# Patient Record
Sex: MALE | Race: WHITE | NOT HISPANIC OR LATINO | URBAN - METROPOLITAN AREA
[De-identification: names, ages, dates, MRNs, and addresses within clinical notes are randomized per-mention and may not be internally consistent; named-entity substitution may affect disease eponyms.]

---

## 2017-04-14 ENCOUNTER — OUTPATIENT (OUTPATIENT)
Dept: OUTPATIENT SERVICES | Facility: HOSPITAL | Age: 57
LOS: 1 days | Discharge: HOME | End: 2017-04-14
Payer: COMMERCIAL

## 2017-04-14 DIAGNOSIS — I88.9 NONSPECIFIC LYMPHADENITIS, UNSPECIFIED: ICD-10-CM

## 2017-04-14 PROCEDURE — 70491 CT SOFT TISSUE NECK W/DYE: CPT | Mod: 26

## 2019-03-20 ENCOUNTER — APPOINTMENT (OUTPATIENT)
Dept: DERMATOLOGY | Facility: CLINIC | Age: 59
End: 2019-03-20
Payer: COMMERCIAL

## 2019-03-20 VITALS — WEIGHT: 215 LBS | HEIGHT: 68 IN | BODY MASS INDEX: 32.58 KG/M2

## 2019-03-20 DIAGNOSIS — Z86.39 PERSONAL HISTORY OF OTHER ENDOCRINE, NUTRITIONAL AND METABOLIC DISEASE: ICD-10-CM

## 2019-03-20 DIAGNOSIS — L73.8 OTHER SPECIFIED FOLLICULAR DISORDERS: ICD-10-CM

## 2019-03-20 DIAGNOSIS — D48.9 NEOPLASM OF UNCERTAIN BEHAVIOR, UNSPECIFIED: ICD-10-CM

## 2019-03-20 PROCEDURE — 11102 TANGNTL BX SKIN SINGLE LES: CPT

## 2019-03-20 PROCEDURE — 99203 OFFICE O/P NEW LOW 30 MIN: CPT | Mod: 25

## 2019-03-20 RX ORDER — METRONIDAZOLE 500 MG/1
500 TABLET ORAL
Qty: 40 | Refills: 0 | Status: DISCONTINUED | COMMUNITY
Start: 2018-12-17

## 2019-03-20 RX ORDER — CIPROFLOXACIN HYDROCHLORIDE 500 MG/1
500 TABLET, FILM COATED ORAL
Qty: 20 | Refills: 0 | Status: DISCONTINUED | COMMUNITY
Start: 2018-12-17

## 2019-03-20 RX ORDER — ASPIRIN 81 MG
81 TABLET,CHEWABLE ORAL
Refills: 0 | Status: ACTIVE | COMMUNITY

## 2019-03-20 RX ORDER — ATORVASTATIN CALCIUM 10 MG/1
10 TABLET, FILM COATED ORAL
Qty: 30 | Refills: 0 | Status: ACTIVE | COMMUNITY
Start: 2018-12-24

## 2019-03-20 RX ORDER — AZITHROMYCIN 250 MG/1
250 TABLET, FILM COATED ORAL
Qty: 6 | Refills: 0 | Status: DISCONTINUED | COMMUNITY
Start: 2018-10-09

## 2019-03-20 RX ORDER — LEVOFLOXACIN 750 MG/1
750 TABLET, FILM COATED ORAL
Qty: 5 | Refills: 0 | Status: DISCONTINUED | COMMUNITY
Start: 2019-01-15

## 2019-03-20 RX ORDER — ALBUTEROL SULFATE 90 UG/1
108 (90 BASE) AEROSOL, METERED RESPIRATORY (INHALATION)
Qty: 18 | Refills: 0 | Status: DISCONTINUED | COMMUNITY
Start: 2018-12-24

## 2019-03-20 NOTE — ASSESSMENT
[FreeTextEntry1] : 1) benign findings as above- education\par -skin tags on separate visit at [facility]\par \par 2) Shave bx location left frontal scalp\par diagnosis: ruleo ut epidermal nevus vs. SK\par  \par Shave biopsy performed today over above location, risks and benefits discussed including incomplete removal, not enough tissue for diagnosis scarring and infection, informed consent obtained, pictures taken,  cleaned with alcohol and anesthetized with 1%lido+epi, 0.3 cc total, hemostasis obtained with light dessication, vaseline and bandaid placed, tolerated well, wound care reviewed, specimen sent to pathology.\par \par

## 2019-03-20 NOTE — PHYSICAL EXAM
[FreeTextEntry3] : AAOx3, pleasant, NAD, no visual lymphadenopathy\par hair, scalp, face, nose, eyelids, ears, lips, oropharynx, neck, chest, abdomen, back, right arm, left arm, nails, and hands examined with all normal findings,\par pertinent findings include:\par \par Pink/tan umbilicated firm papules with central dell on face;\par skin tags on axillae and groin\par verrucous plaque on left frontal scalp\par Scaling waxy stuck on papule on left torso

## 2019-03-20 NOTE — HISTORY OF PRESENT ILLNESS
[FreeTextEntry1] : growth [de-identified] : patient here with growths. would like skin tags evaluated. also with growth on left torso. \par also bleeding growth on left scalp.

## 2019-03-26 ENCOUNTER — MOBILE ON CALL (OUTPATIENT)
Age: 59
End: 2019-03-26

## 2019-03-29 LAB — CORE LAB BIOPSY: NORMAL

## 2019-09-26 ENCOUNTER — APPOINTMENT (OUTPATIENT)
Dept: GASTROENTEROLOGY | Facility: CLINIC | Age: 59
End: 2019-09-26
Payer: COMMERCIAL

## 2019-09-26 VITALS — HEART RATE: 87 BPM | SYSTOLIC BLOOD PRESSURE: 136 MMHG | HEIGHT: 68 IN | DIASTOLIC BLOOD PRESSURE: 79 MMHG

## 2019-09-26 DIAGNOSIS — K62.5 HEMORRHAGE OF ANUS AND RECTUM: ICD-10-CM

## 2019-09-26 PROCEDURE — 99205 OFFICE O/P NEW HI 60 MIN: CPT | Mod: 25

## 2019-09-26 PROCEDURE — 82274 ASSAY TEST FOR BLOOD FECAL: CPT | Mod: QW

## 2019-09-26 RX ORDER — SODIUM SULFATE, POTASSIUM SULFATE, MAGNESIUM SULFATE 17.5; 3.13; 1.6 G/ML; G/ML; G/ML
17.5-3.13-1.6 SOLUTION, CONCENTRATE ORAL
Qty: 1 | Refills: 0 | Status: ACTIVE | COMMUNITY
Start: 2019-09-26 | End: 1900-01-01

## 2019-09-26 NOTE — ASSESSMENT
[FreeTextEntry1] : #1 history of colon polyps with recent rectal bleeding we'll schedule colonoscopy despite the fact that the Hemoccult was negative #2 enlarged liver in someone who has elevated cholesterol does not consume much alcohol and no history of diabetes but has recently gained weight will evaluate for fatty liver will rule out any genetic causes of cirrhosis

## 2019-09-26 NOTE — HISTORY OF PRESENT ILLNESS
[FreeTextEntry1] : Should his today as is noticed frequent episodes of bright red blood per rectum he began driving on a motorcycle several months ago he denies any rectal pain or constipation denies any diarrhea the blood would squirt out. He has had weight gain over the past few years he denies alcohol use or smoking his father recently  and was told he had cirrhosis

## 2019-10-30 ENCOUNTER — APPOINTMENT (OUTPATIENT)
Dept: GASTROENTEROLOGY | Facility: AMBULATORY MEDICAL SERVICES | Age: 59
End: 2019-10-30

## 2020-03-02 ENCOUNTER — RESULT REVIEW (OUTPATIENT)
Age: 60
End: 2020-03-02

## 2020-03-02 ENCOUNTER — APPOINTMENT (OUTPATIENT)
Dept: GASTROENTEROLOGY | Facility: AMBULATORY MEDICAL SERVICES | Age: 60
End: 2020-03-02
Payer: COMMERCIAL

## 2020-03-02 PROCEDURE — 45380 COLONOSCOPY AND BIOPSY: CPT

## 2020-08-03 ENCOUNTER — APPOINTMENT (OUTPATIENT)
Dept: UROLOGY | Facility: CLINIC | Age: 60
End: 2020-08-03
Payer: COMMERCIAL

## 2020-08-13 ENCOUNTER — APPOINTMENT (OUTPATIENT)
Dept: UROLOGY | Facility: CLINIC | Age: 60
End: 2020-08-13
Payer: COMMERCIAL

## 2020-08-13 VITALS
SYSTOLIC BLOOD PRESSURE: 115 MMHG | BODY MASS INDEX: 32.58 KG/M2 | OXYGEN SATURATION: 96 % | TEMPERATURE: 98 F | HEART RATE: 81 BPM | DIASTOLIC BLOOD PRESSURE: 73 MMHG | HEIGHT: 68 IN | WEIGHT: 215 LBS

## 2020-08-13 DIAGNOSIS — Z87.891 PERSONAL HISTORY OF NICOTINE DEPENDENCE: ICD-10-CM

## 2020-08-13 DIAGNOSIS — Z80.52 FAMILY HISTORY OF MALIGNANT NEOPLASM OF BLADDER: ICD-10-CM

## 2020-08-13 DIAGNOSIS — Z78.9 OTHER SPECIFIED HEALTH STATUS: ICD-10-CM

## 2020-08-13 PROCEDURE — 99204 OFFICE O/P NEW MOD 45 MIN: CPT

## 2020-08-13 NOTE — HISTORY OF PRESENT ILLNESS
[FreeTextEntry1] : 60 year old man  with complaint of microscopic hematuria. This was seen on routine screening UA, he believes a urine dip. He reports he had hematuria work up by another urologist with CT and cysto, about a year ago. He says these were negative. We do not have any medical records to review. \par It is mild in severity as the patient denies any gross hematuria. Nothing makes symptoms occur. \par It is associated with nothing. No other LUTS. \par No gross hematuria, no dysuria, no frequency, no urgency, no hesitancy, no straining. No incontinence. \par No fevers, no chills, no nausea, no vomiting, no flank pain. \par \par Of note, father has bladder cancer, twin brother is being worked up for possible kidney stones.

## 2020-08-13 NOTE — PHYSICAL EXAM
[General Appearance - Well Developed] : well developed [General Appearance - Well Nourished] : well nourished [Normal Appearance] : normal appearance [Well Groomed] : well groomed [General Appearance - In No Acute Distress] : no acute distress [Edema] : no peripheral edema [Respiration, Rhythm And Depth] : normal respiratory rhythm and effort [Exaggerated Use Of Accessory Muscles For Inspiration] : no accessory muscle use [Abdomen Soft] : soft [Abdomen Tenderness] : non-tender [Costovertebral Angle Tenderness] : no ~M costovertebral angle tenderness [Urethral Meatus] : meatus normal [Penis Abnormality] : normal circumcised penis [Urinary Bladder Findings] : the bladder was normal on palpation [Scrotum] : the scrotum was normal [Testes Tenderness] : no tenderness of the testes [Testes Mass (___cm)] : there were no testicular masses [Prostate Size ___ gm] : prostate size [unfilled] gm [No Prostate Nodules] : no prostate nodules [Normal Station and Gait] : the gait and station were normal for the patient's age [] : no rash [No Focal Deficits] : no focal deficits [Affect] : the affect was normal [Oriented To Time, Place, And Person] : oriented to person, place, and time [Mood] : the mood was normal [No Palpable Adenopathy] : no palpable adenopathy [Not Anxious] : not anxious

## 2020-08-13 NOTE — REVIEW OF SYSTEMS
[Cough] : cough [Shortness Of Breath] : shortness of breath [Wheezing] : wheezing [Abdominal Pain] : abdominal pain [Vomiting] : vomiting [Diarrhea] : diarrhea [Heartburn] : heartburn [see HPI] : see HPI [Wake up at night to urinate  How many times?  ___] : wakes up to urinate [unfilled] times during the night [Told you have blood in urine on a urine test] : told blood was present in a urine test [Negative] : Heme/Lymph

## 2020-08-14 LAB
APPEARANCE: CLEAR
BACTERIA: NEGATIVE
BILIRUBIN URINE: NEGATIVE
BLOOD URINE: ABNORMAL
COLOR: YELLOW
GLUCOSE QUALITATIVE U: NEGATIVE
HYALINE CASTS: 0 /LPF
KETONES URINE: NEGATIVE
LEUKOCYTE ESTERASE URINE: NEGATIVE
MICROSCOPIC-UA: NORMAL
NITRITE URINE: NEGATIVE
PH URINE: 5.5
PROTEIN URINE: NEGATIVE
RED BLOOD CELLS URINE: 2 /HPF
SPECIFIC GRAVITY URINE: 1.03
SQUAMOUS EPITHELIAL CELLS: 0 /HPF
UROBILINOGEN URINE: NORMAL
WHITE BLOOD CELLS URINE: 0 /HPF

## 2020-08-21 LAB — BACTERIA UR CULT: NORMAL

## 2020-09-10 ENCOUNTER — APPOINTMENT (OUTPATIENT)
Dept: GASTROENTEROLOGY | Facility: CLINIC | Age: 60
End: 2020-09-10

## 2020-11-12 ENCOUNTER — APPOINTMENT (OUTPATIENT)
Dept: UROLOGY | Facility: CLINIC | Age: 60
End: 2020-11-12
Payer: COMMERCIAL

## 2020-11-12 VITALS
DIASTOLIC BLOOD PRESSURE: 76 MMHG | OXYGEN SATURATION: 95 % | BODY MASS INDEX: 33.74 KG/M2 | SYSTOLIC BLOOD PRESSURE: 115 MMHG | HEART RATE: 71 BPM | WEIGHT: 215 LBS | TEMPERATURE: 97.7 F | HEIGHT: 67 IN

## 2020-11-12 DIAGNOSIS — R31.29 OTHER MICROSCOPIC HEMATURIA: ICD-10-CM

## 2020-11-12 PROCEDURE — 99213 OFFICE O/P EST LOW 20 MIN: CPT

## 2020-11-12 PROCEDURE — 99072 ADDL SUPL MATRL&STAF TM PHE: CPT

## 2020-11-12 NOTE — REVIEW OF SYSTEMS
[Shortness Of Breath] : shortness of breath [Cough] : cough [Wheezing] : wheezing [Abdominal Pain] : abdominal pain [Vomiting] : vomiting [Diarrhea] : diarrhea [Heartburn] : heartburn [see HPI] : see HPI [Told you have blood in urine on a urine test] : told blood was present in a urine test [Wake up at night to urinate  How many times?  ___] : wakes up to urinate [unfilled] times during the night [Negative] : Heme/Lymph

## 2020-11-12 NOTE — HISTORY OF PRESENT ILLNESS
[FreeTextEntry1] : 60 year old man  with complaint of microscopic hematuria. This was seen on routine screening UA, he believes a urine dip. He reports he had hematuria work up by another urologist with CT and cysto, about a year ago. He says these were negative. We do not have any medical records to review. \par It is mild in severity as the patient denies any gross hematuria. Nothing makes symptoms occur. \par It is associated with nothing. No other LUTS. \par No gross hematuria, no dysuria, no frequency, no urgency, no hesitancy, no straining. No incontinence. \par No fevers, no chills, no nausea, no vomiting, no flank pain. Of note, father has bladder cancer, twin brother is being worked up for possible kidney stones. \par \par 11/12/2020: Patient presents for follow up. He denies new  symptoms and other medical  issues remain unchanged from above. No hematuria, no dysuria, no frequency, no urgency, no hesitancy, no straining. No incontinence. No fevers, no chills, no nausea, no vomiting, no flank pain. Repeat UA by Dr Mantilla showed 7 RBCs/hpf

## 2020-11-12 NOTE — ASSESSMENT
[FreeTextEntry1] : 60 year old man with microscopic hematuria. We discussed that the differential diagnosis includes both benign and malignant conditions including renal stones, BPH, urinary tract infections, and cancer of the bladder or ureter or kidney. Cystoscopy was recommended to rule out pathology in the bladder. CT Urogram was recommended to evaluate for presence of nephroureteral stones or malignancies. Urinalysis and urine culture were recommended to check for urinary tract infection. Pt agrees and understands.

## 2020-11-13 LAB — URINE CYTOLOGY: NORMAL

## 2020-11-24 ENCOUNTER — NON-APPOINTMENT (OUTPATIENT)
Age: 60
End: 2020-11-24

## 2020-12-01 ENCOUNTER — TRANSCRIPTION ENCOUNTER (OUTPATIENT)
Age: 60
End: 2020-12-01

## 2020-12-08 ENCOUNTER — APPOINTMENT (OUTPATIENT)
Dept: UROLOGY | Facility: CLINIC | Age: 60
End: 2020-12-08

## 2020-12-22 ENCOUNTER — APPOINTMENT (OUTPATIENT)
Dept: GASTROENTEROLOGY | Facility: CLINIC | Age: 60
End: 2020-12-22
Payer: COMMERCIAL

## 2020-12-22 VITALS
SYSTOLIC BLOOD PRESSURE: 126 MMHG | HEIGHT: 67 IN | DIASTOLIC BLOOD PRESSURE: 91 MMHG | TEMPERATURE: 97 F | BODY MASS INDEX: 33.12 KG/M2 | HEART RATE: 72 BPM | WEIGHT: 211 LBS

## 2020-12-22 DIAGNOSIS — E66.3 OVERWEIGHT: ICD-10-CM

## 2020-12-22 DIAGNOSIS — K76.89 OTHER SPECIFIED DISEASES OF LIVER: ICD-10-CM

## 2020-12-22 PROCEDURE — 99214 OFFICE O/P EST MOD 30 MIN: CPT

## 2020-12-22 PROCEDURE — 99072 ADDL SUPL MATRL&STAF TM PHE: CPT

## 2020-12-22 NOTE — ASSESSMENT
[FreeTextEntry1] : overweight will begin dieting and 6 week follow up\par fatty liver will check bloodwork\par hepatic cyst repeat sono 1 year\par gall stones on ct asy[facility]tomatic will monitor on low fat diet

## 2020-12-22 NOTE — PHYSICAL EXAM
[General Appearance - Alert] : alert [General Appearance - In No Acute Distress] : in no acute distress [Sclera] : the sclera and conjunctiva were normal [PERRL With Normal Accommodation] : pupils were equal in size, round, and reactive to light [Extraocular Movements] : extraocular movements were intact [Outer Ear] : the ears and nose were normal in appearance [Oropharynx] : the oropharynx was normal [Neck Appearance] : the appearance of the neck was normal [Neck Cervical Mass (___cm)] : no neck mass was observed [Jugular Venous Distention Increased] : there was no jugular-venous distention [Thyroid Diffuse Enlargement] : the thyroid was not enlarged [Thyroid Nodule] : there were no palpable thyroid nodules [Auscultation Breath Sounds / Voice Sounds] : lungs were clear to auscultation bilaterally [Heart Rate And Rhythm] : heart rate was normal and rhythm regular [Heart Sounds] : normal S1 and S2 [Heart Sounds Gallop] : no gallops [Murmurs] : no murmurs [Heart Sounds Pericardial Friction Rub] : no pericardial rub [Bowel Sounds] : normal bowel sounds [Abdomen Soft] : soft [Abdomen Tenderness] : non-tender [Abdomen Mass (___ Cm)] : no abdominal mass palpated [Liver Enlargement] : was enlarged [Normal Sphincter Tone] : normal sphincter tone [No Rectal Mass] : no rectal mass [Occult Blood Positive] : stool was negative for occult blood [Abnormal Walk] : normal gait [Nail Clubbing] : no clubbing  or cyanosis of the fingernails [Musculoskeletal - Swelling] : no joint swelling seen [Motor Tone] : muscle strength and tone were normal [Skin Color & Pigmentation] : normal skin color and pigmentation [Skin Turgor] : normal skin turgor [] : no rash [Deep Tendon Reflexes (DTR)] : deep tendon reflexes were 2+ and symmetric [Sensation] : the sensory exam was normal to light touch and pinprick [No Focal Deficits] : no focal deficits [Oriented To Time, Place, And Person] : oriented to person, place, and time [Impaired Insight] : insight and judgment were intact [Affect] : the affect was normal

## 2020-12-22 NOTE — HISTORY OF PRESENT ILLNESS
[FreeTextEntry1] : recent colonoscopy showed hyperplastic polyps He had a Urologist send a ct scan rule kidnet stone and a hepatic cyst was seen He has been dieting due to poor eating habits He feels well with 10 lb weight loss He has a history of fatty liver as well He drinks alcohol 2x per week

## 2021-02-11 ENCOUNTER — APPOINTMENT (OUTPATIENT)
Dept: GASTROENTEROLOGY | Facility: CLINIC | Age: 61
End: 2021-02-11

## 2021-03-31 ENCOUNTER — APPOINTMENT (OUTPATIENT)
Dept: DERMATOLOGY | Facility: CLINIC | Age: 61
End: 2021-03-31
Payer: COMMERCIAL

## 2021-03-31 ENCOUNTER — NON-APPOINTMENT (OUTPATIENT)
Age: 61
End: 2021-03-31

## 2021-03-31 VITALS — WEIGHT: 215 LBS | BODY MASS INDEX: 32.58 KG/M2 | HEIGHT: 68 IN

## 2021-03-31 DIAGNOSIS — L91.8 OTHER HYPERTROPHIC DISORDERS OF THE SKIN: ICD-10-CM

## 2021-03-31 DIAGNOSIS — D22.9 MELANOCYTIC NEVI, UNSPECIFIED: ICD-10-CM

## 2021-03-31 PROCEDURE — D0127: CPT

## 2021-03-31 PROCEDURE — 99213 OFFICE O/P EST LOW 20 MIN: CPT | Mod: 25

## 2021-03-31 PROCEDURE — 99072 ADDL SUPL MATRL&STAF TM PHE: CPT

## 2021-03-31 RX ORDER — KETOCONAZOLE 20.5 MG/ML
2 SHAMPOO, SUSPENSION TOPICAL
Qty: 1 | Refills: 5 | Status: ACTIVE | COMMUNITY
Start: 2021-03-31 | End: 1900-01-01

## 2021-04-01 PROBLEM — L91.8 SKIN TAG: Status: ACTIVE | Noted: 2019-03-20

## 2021-04-01 PROBLEM — D22.9 ACQUIRED MELANOCYTIC NEVUS: Status: ACTIVE | Noted: 2021-04-01

## 2021-12-28 NOTE — PHYSICAL EXAM
[General Appearance - Alert] : alert [General Appearance - In No Acute Distress] : in no acute distress [Sclera] : the sclera and conjunctiva were normal [PERRL With Normal Accommodation] : pupils were equal in size, round, and reactive to light [Extraocular Movements] : extraocular movements were intact [Outer Ear] : the ears and nose were normal in appearance [Neck Appearance] : the appearance of the neck was normal [Oropharynx] : the oropharynx was normal [Neck Cervical Mass (___cm)] : no neck mass was observed [Jugular Venous Distention Increased] : there was no jugular-venous distention To get better and follow your care plan as instructed. [Thyroid Diffuse Enlargement] : the thyroid was not enlarged [Thyroid Nodule] : there were no palpable thyroid nodules [] : no respiratory distress [Auscultation Breath Sounds / Voice Sounds] : lungs were clear to auscultation bilaterally [Heart Rate And Rhythm] : heart rate was normal and rhythm regular [Heart Sounds] : normal S1 and S2 [Heart Sounds Gallop] : no gallops [Murmurs] : no murmurs [Heart Sounds Pericardial Friction Rub] : no pericardial rub [Bowel Sounds] : normal bowel sounds [Abdomen Soft] : soft [Abdomen Tenderness] : non-tender [Abdomen Mass (___ Cm)] : no abdominal mass palpated [Liver Enlargement] : was enlarged [Normal Sphincter Tone] : normal sphincter tone [No Rectal Mass] : no rectal mass [Occult Blood Positive] : stool was negative for occult blood

## 2022-09-19 ENCOUNTER — APPOINTMENT (OUTPATIENT)
Dept: DERMATOLOGY | Facility: CLINIC | Age: 62
End: 2022-09-19

## 2023-05-20 ENCOUNTER — APPOINTMENT (OUTPATIENT)
Dept: RADIOLOGY | Facility: CLINIC | Age: 63
End: 2023-05-20
Payer: COMMERCIAL

## 2023-05-20 ENCOUNTER — OUTPATIENT (OUTPATIENT)
Dept: OUTPATIENT SERVICES | Facility: HOSPITAL | Age: 63
LOS: 1 days | End: 2023-05-20
Payer: COMMERCIAL

## 2023-05-20 DIAGNOSIS — Z00.00 ENCOUNTER FOR GENERAL ADULT MEDICAL EXAMINATION WITHOUT ABNORMAL FINDINGS: ICD-10-CM

## 2023-05-20 PROCEDURE — 71046 X-RAY EXAM CHEST 2 VIEWS: CPT | Mod: 26

## 2023-05-20 PROCEDURE — 71046 X-RAY EXAM CHEST 2 VIEWS: CPT

## 2023-06-27 ENCOUNTER — APPOINTMENT (OUTPATIENT)
Dept: PULMONOLOGY | Facility: CLINIC | Age: 63
End: 2023-06-27
Payer: COMMERCIAL

## 2023-06-27 VITALS
SYSTOLIC BLOOD PRESSURE: 122 MMHG | DIASTOLIC BLOOD PRESSURE: 80 MMHG | WEIGHT: 210 LBS | HEIGHT: 66.93 IN | BODY MASS INDEX: 32.96 KG/M2 | HEART RATE: 68 BPM

## 2023-06-27 DIAGNOSIS — Z12.2 ENCOUNTER FOR SCREENING FOR MALIGNANT NEOPLASM OF RESPIRATORY ORGANS: ICD-10-CM

## 2023-06-27 PROCEDURE — 99203 OFFICE O/P NEW LOW 30 MIN: CPT | Mod: 25

## 2023-06-27 PROCEDURE — 94729 DIFFUSING CAPACITY: CPT

## 2023-06-27 PROCEDURE — 94060 EVALUATION OF WHEEZING: CPT

## 2023-06-27 PROCEDURE — ZZZZZ: CPT

## 2023-06-27 PROCEDURE — 94726 PLETHYSMOGRAPHY LUNG VOLUMES: CPT

## 2023-06-27 NOTE — ASSESSMENT
[FreeTextEntry1] : 63M with PMHx HLD and COPD presents to the pulmonary clinic to establish care. \par \par #Former smoker:\par - mild obstructive disease on PFTs, FEV1 and FVC WNL; no reduction in diffusion capacity; patient is asympotmatic\par - does not require use of inhaler therapy at this time\par - meets criteria for low dose CT for lung cancer screening (last in 2020 with small subcentimeter nodules)\par - CT chest ordered\par - encouraged to continue with tobacco cessation and encouraged marijuana cessation\par \par RTC in 1 year or earlier if needed. Discussed with Dr. Ohara.

## 2023-06-27 NOTE — PHYSICAL EXAM
[No Acute Distress] : no acute distress [Normal Oropharynx] : normal oropharynx [Erythema] : erythema [Normal Appearance] : normal appearance [Supple] : supple [Normal Rate/Rhythm] : normal rate/rhythm [Normal S1, S2] : normal s1, s2 [No Resp Distress] : no resp distress [Clear to Auscultation Bilaterally] : clear to auscultation bilaterally [Benign] : benign [No Clubbing] : no clubbing [No Edema] : no edema [No Focal Deficits] : no focal deficits [Oriented x3] : oriented x3 [Normal Affect] : normal affect

## 2023-06-27 NOTE — REVIEW OF SYSTEMS
[Fever] : no fever [Chills] : no chills [Cough] : no cough [Sputum] : no sputum [Chest Discomfort] : no chest discomfort [Orthopnea] : no orthopnea [GERD] : no gerd [Arthralgias] : no arthralgias [Headache] : no headache

## 2023-06-27 NOTE — HISTORY OF PRESENT ILLNESS
[TextBox_4] : 63M with PMHx HLD and COPD presents to the pulmonary clinic to establish care. \par \par Patient previously under the care of Dr Doherty. He was diagnosed with COPD and prescribed an as needed inhaler which he has not used.\par He denies any limitations of physical activity due to shortness of breath or dyspnea on exertion. Patient reports he coughs up sputum in the morning, and is okay for the rest of the day.\par He was hospitalized once ~ 10 years ago for pneumonia, not intubated. \par \par \par Social History:\par Smoking: Quit ~6 months ago; smoked 1PPD day x 30 years. Was vaping for a few months after quitting, no longer vapes. 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 \par \par Medications:\par Statin \par Sertraline qD\par Prisolec ; heart burn roll aid\par \par \par

## 2023-07-07 ENCOUNTER — APPOINTMENT (OUTPATIENT)
Dept: GASTROENTEROLOGY | Facility: CLINIC | Age: 63
End: 2023-07-07
Payer: COMMERCIAL

## 2023-07-07 VITALS
TEMPERATURE: 63 F | HEIGHT: 66 IN | WEIGHT: 205 LBS | SYSTOLIC BLOOD PRESSURE: 132 MMHG | DIASTOLIC BLOOD PRESSURE: 87 MMHG | BODY MASS INDEX: 32.95 KG/M2

## 2023-07-07 DIAGNOSIS — Z12.11 ENCOUNTER FOR SCREENING FOR MALIGNANT NEOPLASM OF COLON: ICD-10-CM

## 2023-07-07 DIAGNOSIS — R10.30 LOWER ABDOMINAL PAIN, UNSPECIFIED: ICD-10-CM

## 2023-07-07 DIAGNOSIS — Z86.010 PERSONAL HISTORY OF COLONIC POLYPS: ICD-10-CM

## 2023-07-07 PROCEDURE — 99213 OFFICE O/P EST LOW 20 MIN: CPT

## 2023-07-07 NOTE — ASSESSMENT
[FreeTextEntry1] : 64yo male with lower abdominal pain\par \par will check ct scan r/o diverticulitis\par will check surveillance colonoscopy 3/25

## 2023-07-07 NOTE — HISTORY OF PRESENT ILLNESS
[FreeTextEntry1] : 62yo male with lower abdominal pain\par He has had intermittent lower abdominal pain, now getting worse\par Some alterations in BMs, no fever\par he has hx colon polyps due 3/25

## 2023-07-07 NOTE — PHYSICAL EXAM
[Alert] : alert [Normal Voice/Communication] : normal voice/communication [Healthy Appearing] : healthy appearing [No Acute Distress] : no acute distress [Sclera] : the sclera and conjunctiva were normal [Hearing Threshold Finger Rub Not [place]] : hearing was normal [Normal Lips/Gums] : the lips and gums were normal [Oropharynx] : the oropharynx was normal [Normal Appearance] : the appearance of the neck was normal [No Neck Mass] : no neck mass was observed [No Respiratory Distress] : no respiratory distress [No Acc Muscle Use] : no accessory muscle use [Respiration, Rhythm And Depth] : normal respiratory rhythm and effort [Auscultation Breath Sounds / Voice Sounds] : lungs were clear to auscultation bilaterally [Heart Rate And Rhythm] : heart rate was normal and rhythm regular [Normal S1, S2] : normal S1 and S2 [Murmurs] : no murmurs [Bowel Sounds] : normal bowel sounds [No Masses] : no abdominal mass palpated [Abdomen Soft] : soft [] : no hepatosplenomegaly [Oriented To Time, Place, And Person] : oriented to person, place, and time [de-identified] : tender to palpation, lower abdomen left more than right

## 2023-07-10 ENCOUNTER — RESULT REVIEW (OUTPATIENT)
Age: 63
End: 2023-07-10

## 2023-07-17 ENCOUNTER — APPOINTMENT (OUTPATIENT)
Dept: CT IMAGING | Facility: CLINIC | Age: 63
End: 2023-07-17
Payer: COMMERCIAL

## 2023-07-17 ENCOUNTER — OUTPATIENT (OUTPATIENT)
Dept: OUTPATIENT SERVICES | Facility: HOSPITAL | Age: 63
LOS: 1 days | End: 2023-07-17
Payer: COMMERCIAL

## 2023-07-17 DIAGNOSIS — R10.30 LOWER ABDOMINAL PAIN, UNSPECIFIED: ICD-10-CM

## 2023-07-17 PROCEDURE — 74177 CT ABD & PELVIS W/CONTRAST: CPT

## 2023-07-17 PROCEDURE — 74177 CT ABD & PELVIS W/CONTRAST: CPT | Mod: 26

## 2023-08-03 ENCOUNTER — APPOINTMENT (OUTPATIENT)
Dept: CT IMAGING | Facility: CLINIC | Age: 63
End: 2023-08-03
Payer: COMMERCIAL

## 2023-08-03 ENCOUNTER — OUTPATIENT (OUTPATIENT)
Dept: OUTPATIENT SERVICES | Facility: HOSPITAL | Age: 63
LOS: 1 days | End: 2023-08-03
Payer: COMMERCIAL

## 2023-08-03 ENCOUNTER — APPOINTMENT (OUTPATIENT)
Dept: ULTRASOUND IMAGING | Facility: CLINIC | Age: 63
End: 2023-08-03
Payer: COMMERCIAL

## 2023-08-03 DIAGNOSIS — Z12.2 ENCOUNTER FOR SCREENING FOR MALIGNANT NEOPLASM OF RESPIRATORY ORGANS: ICD-10-CM

## 2023-08-03 PROCEDURE — 76700 US EXAM ABDOM COMPLETE: CPT | Mod: 26

## 2023-08-03 PROCEDURE — 71271 CT THORAX LUNG CANCER SCR C-: CPT | Mod: 26

## 2023-08-03 PROCEDURE — 76700 US EXAM ABDOM COMPLETE: CPT

## 2023-08-03 PROCEDURE — 71271 CT THORAX LUNG CANCER SCR C-: CPT

## 2023-09-12 ENCOUNTER — APPOINTMENT (OUTPATIENT)
Dept: DERMATOLOGY | Facility: CLINIC | Age: 63
End: 2023-09-12
Payer: COMMERCIAL

## 2023-09-12 DIAGNOSIS — L29.9 PRURITUS, UNSPECIFIED: ICD-10-CM

## 2023-09-12 DIAGNOSIS — L64.9 ANDROGENIC ALOPECIA, UNSPECIFIED: ICD-10-CM

## 2023-09-12 DIAGNOSIS — Z87.2 PERSONAL HISTORY OF DISEASES OF THE SKIN AND SUBCUTANEOUS TISSUE: ICD-10-CM

## 2023-09-12 DIAGNOSIS — L72.0 EPIDERMAL CYST: ICD-10-CM

## 2023-09-12 DIAGNOSIS — L73.8 OTHER SPECIFIED FOLLICULAR DISORDERS: ICD-10-CM

## 2023-09-12 PROCEDURE — 99214 OFFICE O/P EST MOD 30 MIN: CPT

## 2023-09-12 RX ORDER — MOMETASONE FUROATE 1 MG/ML
0.1 SOLUTION TOPICAL
Qty: 1 | Refills: 1 | Status: ACTIVE | COMMUNITY
Start: 2023-09-12 | End: 1900-01-01

## 2023-09-13 PROBLEM — L72.0 EPIDERMAL INCLUSION CYST: Status: ACTIVE | Noted: 2023-09-13

## 2023-09-13 PROBLEM — L73.8 SEBACEOUS HYPERPLASIA: Status: ACTIVE | Noted: 2023-09-13

## 2023-09-13 PROBLEM — Z87.2 HISTORY OF SEBORRHEIC KERATOSIS: Status: RESOLVED | Noted: 2019-03-20 | Resolved: 2023-09-13

## 2023-09-13 PROBLEM — L64.9 MALE PATTERN ALOPECIA: Status: ACTIVE | Noted: 2023-09-13

## 2023-12-22 ENCOUNTER — EMERGENCY (EMERGENCY)
Facility: HOSPITAL | Age: 63
LOS: 0 days | Discharge: HOME | End: 2023-12-22
Attending: EMERGENCY MEDICINE
Payer: COMMERCIAL

## 2023-12-22 VITALS — HEIGHT: 67 IN

## 2023-12-22 VITALS
HEART RATE: 71 BPM | OXYGEN SATURATION: 96 % | RESPIRATION RATE: 18 BRPM | TEMPERATURE: 98 F | DIASTOLIC BLOOD PRESSURE: 74 MMHG | SYSTOLIC BLOOD PRESSURE: 112 MMHG

## 2023-12-22 DIAGNOSIS — S86.011A STRAIN OF RIGHT ACHILLES TENDON, INITIAL ENCOUNTER: ICD-10-CM

## 2023-12-22 DIAGNOSIS — M79.661 PAIN IN RIGHT LOWER LEG: ICD-10-CM

## 2023-12-22 DIAGNOSIS — M79.89 OTHER SPECIFIED SOFT TISSUE DISORDERS: ICD-10-CM
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
## 2023-12-22 DIAGNOSIS — Z79.82 LONG TERM (CURRENT) USE OF ASPIRIN: ICD-10-CM
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
## 2023-12-22 PROCEDURE — 99284 EMERGENCY DEPT VISIT MOD MDM: CPT | Mod: 25

## 2023-12-22 PROCEDURE — 73700 CT LOWER EXTREMITY W/O DYE: CPT | Mod: 26,RT,MA

## 2023-12-22 PROCEDURE — 73700 CT LOWER EXTREMITY W/O DYE: CPT | Mod: MA,RT

## 2023-12-22 PROCEDURE — 73610 X-RAY EXAM OF ANKLE: CPT | Mod: 26,RT

## 2023-12-22 PROCEDURE — 29515 APPLICATION SHORT LEG SPLINT: CPT | Mod: RT

## 2023-12-22 PROCEDURE — 73610 X-RAY EXAM OF ANKLE: CPT | Mod: RT

## 2023-12-22 PROCEDURE — 99285 EMERGENCY DEPT VISIT HI MDM: CPT

## 2023-12-22 NOTE — ED STATDOCS - NSFOLLOWUPINSTRUCTIONS_ED_ALL_ED_FT
Achilles Tendon Tear  Muscles, tendons, and bones of the lower leg and foot, with a close-up of a torn Achilles tendon.  The Achilles tendon is a cord-like band that connects the muscles of your lower leg (calf) to your heel. The Achilles tendon is the most common site of tendon tearing (rupture).    What are the causes?  This condition may be caused by:  Stress from a sudden stretching of the tendon. For example, this may occur when you land from a jump or when your heel drops down into a hole on uneven ground.  A hard, direct hit to the tendon.  Pushing off your foot forcefully, such as when sprinting, jumping, or changing direction while running.  What increases the risk?  You are more likely to develop this condition if you:  Are a runner.  Play sports that involve sprinting, running, or jumping.  Play contact sports.  Have a weak Achilles tendon. Tendons can weaken from aging, repeat injuries, and chronic tendinitis.  Are male.  Are 30–55 years of age.  Take a type of antibiotic medicine called quinolones.  What are the signs or symptoms?  Symptoms of this condition include:  Hearing a noise, like a pop or a snap, at the time of injury.  Severe, sudden pain in the back of the ankle.  Swelling and bruising.  Inability to actively point your toes down.  Pain when standing or walking.  A feeling of reduced strength when you step on the affected foot.  How is this diagnosed?  This condition is usually diagnosed based on a physical exam.    You may also have imaging tests, such as:  Ultrasound.  X-rays.  MRI.  How is this treated?  This condition may be treated with:  Rest.  Ice applied to the area.  Pain medicine.  Crutches.  A cast, splint, or other device to keep the ankle from moving (immobilized).  Heel wedges to reduce the stretch on your tendon as it heals.  Surgery. This option may depend on your age and your activity level.  Follow these instructions at home:  Medicines    Take over-the-counter and prescription medicines only as told by your health care provider.  Ask your health care provider if the medicine prescribed to you:  Requires you to avoid driving or using heavy machinery.  Can cause constipation. You may need to take these actions to prevent or treat constipation:  Drink enough fluid to keep your urine pale yellow.  Take over-the-counter or prescription medicines.  Eat foods that are high in fiber, such as beans, whole grains, and fresh fruits and vegetables.  Limit foods that are high in fat and processed sugars, such as fried or sweet foods.  If you have a cast:    Do not stick anything inside the cast to scratch your skin. Doing that increases your risk of infection.  You may put lotion on dry skin around the edges of the cast. Do not put lotion on the skin underneath the cast.  If you have a splint or brace:    Wear it as told by your health care provider. Remove it only as told by your health care provider.  Loosen it if your toes tingle, become numb, or turn cold and blue.  If you have a cast, splint, or brace:    Keep it clean and dry.  Check the skin around it every day. Tell your health care provider about any concerns.  Ask your health care provider when it is safe to drive if you have it on a leg or foot that you use for driving.  Bathing    Do not take baths, swim, or use a hot tub until your health care provider approves. Ask your health care provider if you may take showers. You may only be allowed to take sponge baths.  If the cast, splint, or brace is not waterproof:  Do not let it get wet.  Cover it with a watertight covering when you take a bath or shower.  Managing pain, stiffness, and swelling    Bag of ice on a towel on the skin.  If directed, put ice on the injured area.  If you have a removable splint or brace, remove it as told by your health care provider.  Put ice in a plastic bag.  Place a towel between your skin and the bag or between your cast and the bag.  Leave the ice on for 20 minutes, 2–3 times a day.  Move your toes often to avoid stiffness and to lessen swelling.  Raise (elevate) the injured area above the level of your heart while you are sitting or lying down.  Activity    Return to your normal activities as told by your health care provider. Ask your health care provider what activities are safe for you.  Do not use the injured leg to support your body weight until your health care provider says that you can. Use crutches as told by your health care provider.  Ask your health care provider which exercises are safe for you.  General instructions    Do not put pressure on any part of a cast or splint until it is fully hardened. This may take several hours.  Do not use any products that contain nicotine or tobacco, such as cigarettes, e-cigarettes, and chewing tobacco. These can delay healing. If you need help quitting, ask your health care provider.  Use heel wedges if told by your health care provider.  Keep all follow-up visits as told by your health care provider. This is important.  How is this prevented?  Do exercises exactly as told by your therapist or health care provider and adjust them as directed.  Warm up and stretch before being active.  Cool down and stretch after being active.  Rest between periods of activity.  Use equipment that fits you.  Contact a health care provider if you have:  More pain and swelling.  Pain that is not controlled with medicines.  New symptoms or symptoms that get worse.  Problems moving your toes or foot.  Warmth in your foot.  A fever.  Summary  An Achilles tendon tear is an injury that involves a tear in this tendon.  This injury typically occurs in runners or athletes who play sports that involve sprinting, running, or jumping.  An Achilles tendon tear causes sudden severe pain in your ankle and an inability to point your foot down.  Treatment for this injury may include rest, ice, and pain medicines. In some cases, surgery may be needed.  This information is not intended to replace advice given to you by your health care provider. Make sure you discuss any questions you have with your health care provider.    Document Revised: 02/08/2022 Document Reviewed: 02/08/2022  [employer] Patient Education © 2023 [employer]  [employer] logo  Terms and Conditions  Privacy Policy  Editorial Policy  All content on this site: Copyright © 2023 [employer], its licensors, and contributors. All rights are reserved, including those for text and data mining, [employer] training, and similar technologies. For all open access content, the Creative Commons licensing terms apply.  Cookies are used by this site. To decline or learn more, visit our Cookies page.          Take 1 Aspirin (325mg) daily.      Continue to elevate Right leg.      Keep splint dry. Achilles Tendon Tear  Muscles, tendons, and bones of the lower leg and foot, with a close-up of a torn Achilles tendon.  The Achilles tendon is a cord-like band that connects the muscles of your lower leg (calf) to your heel. The Achilles tendon is the most common site of tendon tearing (rupture).    What are the causes?  This condition may be caused by:  Stress from a sudden stretching of the tendon. For example, this may occur when you land from a jump or when your heel drops down into a hole on uneven ground.  A hard, direct hit to the tendon.  Pushing off your foot forcefully, such as when sprinting, jumping, or changing direction while running.  What increases the risk?  You are more likely to develop this condition if you:  Are a runner.  Play sports that involve sprinting, running, or jumping.  Play contact sports.  Have a weak Achilles tendon. Tendons can weaken from aging, repeat injuries, and chronic tendinitis.  Are male.  Are 30–55 years of age.  Take a type of antibiotic medicine called quinolones.  What are the signs or symptoms?  Symptoms of this condition include:  Hearing a noise, like a pop or a snap, at the time of injury.  Severe, sudden pain in the back of the ankle.  Swelling and bruising.  Inability to actively point your toes down.  Pain when standing or walking.  A feeling of reduced strength when you step on the affected foot.  How is this diagnosed?  This condition is usually diagnosed based on a physical exam.    You may also have imaging tests, such as:  Ultrasound.  X-rays.  MRI.  How is this treated?  This condition may be treated with:  Rest.  Ice applied to the area.  Pain medicine.  Crutches.  A cast, splint, or other device to keep the ankle from moving (immobilized).  Heel wedges to reduce the stretch on your tendon as it heals.  Surgery. This option may depend on your age and your activity level.  Follow these instructions at home:  Medicines    Take over-the-counter and prescription medicines only as told by your health care provider.  Ask your health care provider if the medicine prescribed to you:  Requires you to avoid driving or using heavy machinery.  Can cause constipation. You may need to take these actions to prevent or treat constipation:  Drink enough fluid to keep your urine pale yellow.  Take over-the-counter or prescription medicines.  Eat foods that are high in fiber, such as beans, whole grains, and fresh fruits and vegetables.  Limit foods that are high in fat and processed sugars, such as fried or sweet foods.  If you have a cast:    Do not stick anything inside the cast to scratch your skin. Doing that increases your risk of infection.  You may put lotion on dry skin around the edges of the cast. Do not put lotion on the skin underneath the cast.  If you have a splint or brace:    Wear it as told by your health care provider. Remove it only as told by your health care provider.  Loosen it if your toes tingle, become numb, or turn cold and blue.  If you have a cast, splint, or brace:    Keep it clean and dry.  Check the skin around it every day. Tell your health care provider about any concerns.  Ask your health care provider when it is safe to drive if you have it on a leg or foot that you use for driving.  Bathing    Do not take baths, swim, or use a hot tub until your health care provider approves. Ask your health care provider if you may take showers. You may only be allowed to take sponge baths.  If the cast, splint, or brace is not waterproof:  Do not let it get wet.  Cover it with a watertight covering when you take a bath or shower.  Managing pain, stiffness, and swelling    Bag of ice on a towel on the skin.  If directed, put ice on the injured area.  If you have a removable splint or brace, remove it as told by your health care provider.  Put ice in a plastic bag.  Place a towel between your skin and the bag or between your cast and the bag.  Leave the ice on for 20 minutes, 2–3 times a day.  Move your toes often to avoid stiffness and to lessen swelling.  Raise (elevate) the injured area above the level of your heart while you are sitting or lying down.  Activity    Return to your normal activities as told by your health care provider. Ask your health care provider what activities are safe for you.  Do not use the injured leg to support your body weight until your health care provider says that you can. Use crutches as told by your health care provider.  Ask your health care provider which exercises are safe for you.  General instructions    Do not put pressure on any part of a cast or splint until it is fully hardened. This may take several hours.  Do not use any products that contain nicotine or tobacco, such as cigarettes, e-cigarettes, and chewing tobacco. These can delay healing. If you need help quitting, ask your health care provider.  Use heel wedges if told by your health care provider.  Keep all follow-up visits as told by your health care provider. This is important.  How is this prevented?  Do exercises exactly as told by your therapist or health care provider and adjust them as directed.  Warm up and stretch before being active.  Cool down and stretch after being active.  Rest between periods of activity.  Use equipment that fits you.  Contact a health care provider if you have:  More pain and swelling.  Pain that is not controlled with medicines.  New symptoms or symptoms that get worse.  Problems moving your toes or foot.  Warmth in your foot.  A fever.  Summary  An Achilles tendon tear is an injury that involves a tear in this tendon.  This injury typically occurs in runners or athletes who play sports that involve sprinting, running, or jumping.  An Achilles tendon tear causes sudden severe pain in your ankle and an inability to point your foot down.  Treatment for this injury may include rest, ice, and pain medicines. In some cases, surgery may be needed.  This information is not intended to replace advice given to you by your health care provider. Make sure you discuss any questions you have with your health care provider.    Document Revised: 02/08/2022 Document Reviewed: 02/08/2022  [employer] Patient Education © 2023 [employer]  [employer] logo  Terms and Conditions  Privacy Policy  Editorial Policy  All content on this site: Copyright © 2023 [employer], its licensors, and contributors. All rights are reserved, including those for text and data mining, [employer] training, and similar technologies. For all open access content, the Creative Commons licensing terms apply.  Cookies are used by this site. To decline or learn more, visit our Cookies page.          Take 1 Aspirin (325mg) daily.      Continue to elevate Right leg.      Keep splint dry.

## 2023-12-22 NOTE — ED STATDOCS - PATIENT PORTAL LINK FT
You can access the FollowMyHealth Patient Portal offered by [facility] by registering at the following website: [email or web address]. By joining [employer]’s FollowMyHealth portal, you will also be able to view your health information using other applications (apps) compatible with our system. You can access the FollowMyHealth Patient Portal offered by [facility] by registering at the following website: [email or web address]. By joining [employer]’s FollowMyHealth portal, you will also be able to view your health information using other applications (apps) compatible with our system.

## 2023-12-22 NOTE — ED ADULT TRIAGE NOTE - CHIEF COMPLAINT QUOTE
Patient presents to the ER on crutches with complaint of "I tore my achilles." Patient states he was [place] football on the beach when he "felt a pop" to his right ankle. Patient reports swelling and bruising.

## 2023-12-22 NOTE — ED ADULT NURSE NOTE - OBJECTIVE STATEMENT
Patient presents to the ED c/o right posterior calf pain. Pt states "I tore my achilles." Patient states he was on vacation and playing football on the beach when he "felt a pop" to his right ankle. Patient reports swelling and bruising. Pt has been using crutches ever since.

## 2023-12-22 NOTE — ED STATDOCS - OBJECTIVE STATEMENT
62 y/o male with no pertinent PMHx presents to ED c/o right posterior calf pain with swelling.  Pt was on vacation and returned home last night. States he was going [place] the ocean when he turned back around and took a step, felt sudden onset pain and heard a pop. Been on crutches since. No other complaints at this time.

## 2023-12-22 NOTE — ED STATDOCS - PHYSICAL EXAMINATION
Constitutional: NAD AOx3  Eyes: PERRL EOMI  Head: Normocephalic atraumatic  Mouth: MMM  Cardiac: regular rate and rhythm  Resp: Lungs CTAB  GI: Abd s/nd/nt  MSK: +R ankle: no edema, non-TTP, medial gastrocnemius mildly TTP with edema, +Woo test  Neuro: CN2-12 grossly intact, CORONEL x 4  Skin: No visible rashes

## 2023-12-22 NOTE — ED STATDOCS - PROGRESS NOTE DETAILS
63-year-old male Presents to ED complaining of pain to right ankle calf area status post walking on the beach 2 days ago when he felt sudden onset of pain with a pop.  (+) swelling to R calf and achilles tendon at posterior heel.  Mild tender to palp distal achilles .  (+) Woo test  R leg.  NVI R leg.  Will f/u Xray, CT scan and re-eval.  Wife reports they couldn't get Ortho appts for 3 weeks.  Kate Osborne PA-C CT scan with evidence of high grade, proximal achilles tendon tear and distal myotendinous junction. Findings could be better characterize with MRI. Myofascial edema extends along the soleus muscle.  Case discussed with Ortho resident for consultation as pt having difficulty getting outpt appt with Ortho.  Kate Osborne PA-C

## 2023-12-22 NOTE — ED STATDOCS - CARE PROVIDER_API CALL
Erasmo Finney  Orthopaedic Surgery  [address] [place] [id number]  Phone: [phone]  Fax: [phone]  Follow Up Time:    Erasmo Finney  Orthopaedic Surgery  [address] [place] [id number]  Phone: [phone]  Fax: [phone]  Follow Up Time:

## 2023-12-22 NOTE — ED ADULT NURSE NOTE - NSFALLRISKINTERV_ED_ALL_ED
Assistance OOB with selected safe patient handling equipment if applicable/Communicate fall risk and risk factors to all staff, patient, and family/Monitor gait and stability/Provide patient with walking aids/Provide visual cue: yellow wristband, yellow gown, etc/Reinforce activity limits and safety measures with patient and family/Call bell, personal items and telephone in reach/Instruct patient to call for assistance before getting out of bed/chair/stretcher/Non-slip footwear applied when patient is off stretcher/[place] to call system/Physically safe environment - no spills, clutter or unnecessary equipment/Purposeful Proactive Rounding/Room/bathroom lighting operational, light cord in reach Assistance OOB with selected safe patient handling equipment if applicable/Communicate fall risk and risk factors to all staff, patient, and family/Monitor gait and stability/Provide patient with walking aids/Provide visual cue: yellow wristband, yellow gown, etc/Reinforce activity limits and safety measures with patient and family/Call bell, personal items and telephone in reach/Instruct patient to call for assistance before getting out of bed/chair/stretcher/Non-slip footwear applied when patient is off stretcher/[place] to call system/Physically safe environment - no spills, clutter or unnecessary equipment/Purposeful Proactive Rounding/Room/bathroom lighting operational, light cord in reach

## 2023-12-22 NOTE — ED STATDOCS - NS ED ATTENDING STATEMENT MOD
This was a shared visit with the [facility]. I reviewed and verified the documentation and independently performed the documented:

## 2023-12-22 NOTE — ED ADULT NURSE NOTE - DISCHARGE DATE/TIME
Pt returned via cart to [facility] 2.  Pt A&O.  VSS-see flowsheet.  Pt tolerated a
muffin and coffee.  Daughter, Cheryl and son in law present.  Dr Peck
22-Dec-2023 14:18

## 2023-12-22 NOTE — CONSULT NOTE ADULT - SUBJECTIVE AND OBJECTIVE BOX
62y/o M was vacationing in [place] 2 days ago, walking in from the ocean and felt a sudden stabbing pain in the back of his right ankle/heel. pt has needed to use crutches the past 2 days due to difficulty and painful ambulation. pt denies N/T RLE. No other complaints    x rays right ankle : neg for fx/dislocation     CT RLE: findings consistent with high grade tear proximal Achilles and distal myotendinous junction.    PE right ankle   no bony tenderness  skin intact  no significant palpable defect at achilles   tenderness along achilles tendon  + olguin test while prone  compartments soft non tender  able to flex and extend ankle with mild discomfort  SILT   + EHL FHL GS TA   SILT   Dp intact  62y/o M was vacationing in [place] 2 days ago, walking in from the ocean and felt a sudden stabbing pain in the back of his right ankle/heel. pt has needed to use crutches the past 2 days due to difficulty and painful ambulation. pt denies N/T RLE. No other complaints    x rays right ankle : neg for fx/dislocation     CT RLE: findings consistent with high grade tear proximal Achilles and distal myotendinous junction.    PE right ankle   no bony tenderness  skin intact  no significant palpable defect at achilles   tenderness along achilles tendon  + olguin test while prone  compartments soft non tender  able to flex and extend ankle with mild discomfort  SILT   + EHL FHL GS TA   SILT   Dp intact

## 2023-12-22 NOTE — ED STATDOCS - PRINCIPAL DIAGNOSIS
[Normal Sclera/Conjunctiva] : normal sclera/conjunctiva [Normal Oropharynx] : the oropharynx was normal [Normal TMs] : both tympanic membranes were normal [No Edema] : there was no peripheral edema [Normal] : no posterior cervical lymphadenopathy and no anterior cervical lymphadenopathy [Alert and Oriented x3] : oriented to person, place, and time Rupture of right Achilles tendon

## 2023-12-22 NOTE — ED STATDOCS - CARE PROVIDERS DIRECT ADDRESSES
[email or web address]

## 2023-12-22 NOTE — CONSULT NOTE ADULT - ASSESSMENT
A: 63M right achilles tendon rupture    P;  posterior splint in [place]  NWB RLE   ASA 325mg daily   elevation   pain control   cold compress  FU Dr Finney in office next week    fall

## 2023-12-26 PROBLEM — Z78.9 OTHER SPECIFIED HEALTH STATUS: Chronic | Status: ACTIVE | Noted: 2023-12-22

## 2023-12-28 ENCOUNTER — NON-APPOINTMENT (OUTPATIENT)
Age: 63
End: 2023-12-28

## 2023-12-28 ENCOUNTER — APPOINTMENT (OUTPATIENT)
Dept: ULTRASOUND IMAGING | Facility: CLINIC | Age: 63
End: 2023-12-28

## 2023-12-28 ENCOUNTER — OUTPATIENT (OUTPATIENT)
Dept: OUTPATIENT SERVICES | Facility: HOSPITAL | Age: 63
LOS: 1 days | End: 2023-12-28
Payer: COMMERCIAL

## 2023-12-28 ENCOUNTER — APPOINTMENT (OUTPATIENT)
Dept: ORTHOPEDIC SURGERY | Facility: CLINIC | Age: 63
End: 2023-12-28
Payer: COMMERCIAL

## 2023-12-28 VITALS
HEART RATE: 67 BPM | DIASTOLIC BLOOD PRESSURE: 77 MMHG | WEIGHT: 210 LBS | HEIGHT: 67 IN | SYSTOLIC BLOOD PRESSURE: 126 MMHG | BODY MASS INDEX: 32.96 KG/M2

## 2023-12-28 DIAGNOSIS — M79.89 OTHER SPECIFIED SOFT TISSUE DISORDERS: ICD-10-CM

## 2023-12-28 DIAGNOSIS — S86.011A STRAIN OF RIGHT ACHILLES TENDON, INITIAL ENCOUNTER: ICD-10-CM

## 2023-12-28 PROCEDURE — 99203 OFFICE O/P NEW LOW 30 MIN: CPT

## 2023-12-28 PROCEDURE — 93971 EXTREMITY STUDY: CPT

## 2023-12-28 PROCEDURE — 99213 OFFICE O/P EST LOW 20 MIN: CPT

## 2023-12-28 NOTE — DISCUSSION/SUMMARY
[de-identified] : MRI of the right lower extremity without contrast ordered to evaluate the Achilles tendon rupture for surgical planning, Achilles tendon repair.  Venous Doppler ultrasound right lower extremity ordered to evaluate for DVT.  Patient will continue aspirin 325 mg daily for DVT prophylaxis.  Long cam walking boot with heel lifts given.  Knee scooter prescribed.  I told him partial weightbearing only and to not strike his heel.  Continue with RICE therapy for swelling control.  Patient knows that the MRI needs to be authorized by his insurance company.  Once completed, the patient return to office middle of next week to meet with Dr. Finney to discuss surgical versus nonsurgical options for Achilles tendon rupture depending on the MRI findings.  All of his questions were answered.  He understood and agreed to the treatment course.

## 2023-12-28 NOTE — HISTORY OF PRESENT ILLNESS
[FreeTextEntry1] : The patient is a 63-year-old male who presents with right Achilles tendon pain.  The patient went to [facility] on 12/23/2023 where an x-ray of the right ankle and a CT scan of the right ankle were performed.  CT scan questionable for a proximal Achilles tendon tear.  The patient states that he injured his right Achilles tendon last Thursday while in [place] while playing football on the beach with his kids.  He felt a pop in the back portion of his upper ankle/calf.  He did not seek medical attention in [place] and decided to fly home.  The patient states that he has taken 325 mg of aspirin daily.  At the hospital a venous Doppler ultrasound was not performed.  He does have a swollen left leg with pain to the left upper ankle, Achilles tendon region.  He was placed in a splint at [facility] which she arrived in today using crutches.  He has no other complaints.  His pain is controlled.

## 2023-12-28 NOTE — PHYSICAL EXAM
[de-identified] : Right ankle Physical Examination:  General: Alert and oriented x3.  In no acute distress.  Pleasant in nature with a normal affect.  No apparent respiratory distress.  Erythema, Warmth, Rubor: Negative Swelling: Positive swelling left lower extremity/ankle and foot.  ROM: Limited due to the Achilles tendon rupture. 1. Dorsiflexion: 0 degrees 2. Plantarflexion: 40 degrees 3. Inversion: 20 degrees 4. Eversion: 20 degrees  Tenderness to Palpation:  1. Lateral Malleolus: Negative 2. Medial Malleolus: Negative 3. Proximal Fibular Pain: Negative 4. Heel Pain: Negative 5. Cuboid: Negative 6. Navicular: Negative 7. Tibiotalar Joint: Negative 8. Subtalar Joint: Negative 9. Posterior Recess: Negative  Tendon Pain: 1. Achilles: Positive with palpable defect proximal Achilles tendon with a positive Woo test with the patient prone. 2. Peroneals: Negative 3. Posterior Tibialis: Negative 4. Tibialis Anterior: Negative  Ligament Pain: 1. ATFL: Negative 2. CFL: Negative  3. PTFL: Negative 4. Deltoid Ligaments: Negative 5. Lis Franc Ligament: Negative  Stability:  1. Anterior Drawer: Negative 2. Posterior Drawer: Negative  Strength: 5/5 TA/GS/EHL  Pulses: 2+ DP/PT Pulses  Neuro: Intact motor and sensory  Additional Test: 1. Calcaneal Squeeze Test: Negative 2. Syndesmosis Squeeze Test: Negative  *Positive calf tenderness with right lower extremity swelling.  Positive Homans' sign. [de-identified] : ACC: [id number] EXAM: CT LWR EXT RT ORDERED BY: ROBBIN JOSEPH  PROCEDURE DATE: 12/22/2023    INTERPRETATION: HISTORY: Pain in popping sensation at the ankle. Decreased Achilles tendon strength. Gastrocnemius edema.  Helical CT imaging of the right lower extremity from the level of the knee to the level of the ankle was performed without intravenous contrast. Sagittal and coronal reformats were provided.  FINDINGS:  In the region of the proximal Achilles tendon and the distal Achilles myotendinous junction there is circumferential fluid and edema with irregular contour of the Achilles tendon. Findings are consistent with a high-grade tear of the Achilles tendon. This could be better characterized with MRI. In association with this finding the more distal portion of the Achilles tendon appears rounded in morphology suggestive of tendinosis. Myofascial edema extends proximally along the soleal musculature.  There is no evidence of acute fracture or dislocation. There is moderate tricompartmental arthrosis of the knee. The hindfoot articulations appear preserved. There is enthesopathic change along the fibular head. No knee joint effusion.  IMPRESSION:  Findings consistent with a high-grade tear of the proximal Achilles tendon and distal myotendinous junction. Findings could be better characterize with MRI. Myofascial edema extends along the soleus muscle.  No evidence of acute fracture.  --- End of Report ---      TU COOLEY MD; Attending Radiologist This document has been electronically signed. Dec 22 2023 12:36PM   ACC: [id number] EXAM: XR ANKLE COMP MIN 3 VIEWS RT ORDERED BY: ROBBIN JOSEPH  PROCEDURE DATE: 12/22/2023    INTERPRETATION: Right ankle. 3 views. Patient has local pain.  The ankle is rather free of degeneration. No fracture.  IMPRESSION: Negative study.  --- End of Report ---      BRYN MOSLEY MD; Attending Radiologist This document has been electronically signed. Dec 22 2023 11:49AM

## 2024-01-02 ENCOUNTER — APPOINTMENT (OUTPATIENT)
Dept: MRI IMAGING | Facility: CLINIC | Age: 64
End: 2024-01-02

## 2024-01-03 ENCOUNTER — APPOINTMENT (OUTPATIENT)
Dept: ORTHOPEDIC SURGERY | Facility: CLINIC | Age: 64
End: 2024-01-03
Payer: COMMERCIAL

## 2024-01-03 ENCOUNTER — APPOINTMENT (OUTPATIENT)
Dept: MRI IMAGING | Facility: CLINIC | Age: 64
End: 2024-01-03

## 2024-01-03 PROCEDURE — 99214 OFFICE O/P EST MOD 30 MIN: CPT

## 2024-01-03 NOTE — ADDENDUM
[FreeTextEntry1] : I, JACKIE REBECCA, acted solely as a scribe for Dr. Erasmo Finney on this date 01/03/2024  .   All medical record entries made by the Scribe were at my, Dr. Erasmo Finney, direction and personally dictated by me on 01/03/2024 . I have reviewed the chart and agree that the record accurately reflects my personal performance of the history, physical exam, assessment and plan. I have also personally directed, reviewed, and agreed with the chart.

## 2024-01-03 NOTE — PHYSICAL EXAM
[de-identified] : Right ankle Physical Examination:  General: Alert and oriented x3.  In no acute distress.  Pleasant in nature with a normal affect.  No apparent respiratory distress.  Erythema, Warmth, Rubor: Negative Swelling: Positive swelling left lower extremity/ankle and foot.  ROM: Limited due to the Achilles tendon rupture. 1. Dorsiflexion: 0 degrees 2. Plantarflexion: 40 degrees 3. Inversion: 20 degrees 4. Eversion: 20 degrees  Tenderness to Palpation:  1. Lateral Malleolus: Negative 2. Medial Malleolus: Negative 3. Proximal Fibular Pain: Negative 4. Heel Pain: Negative 5. Cuboid: Negative 6. Navicular: Negative 7. Tibiotalar Joint: Negative 8. Subtalar Joint: Negative 9. Posterior Recess: Negative  Tendon Pain: 1. Achilles: Positive with palpable defect proximal Achilles tendon with a positive Woo test with the patient prone. 2. Peroneals: Negative 3. Posterior Tibialis: Negative 4. Tibialis Anterior: Negative  Ligament Pain: 1. ATFL: Negative 2. CFL: Negative  3. PTFL: Negative 4. Deltoid Ligaments: Negative 5. Lis Franc Ligament: Negative  Stability:  1. Anterior Drawer: Negative 2. Posterior Drawer: Negative  Strength: 5/5 TA/GS/EHL  Pulses: 2+ DP/PT Pulses  Neuro: Intact motor and sensory  Additional Test: 1. Calcaneal Squeeze Test: Negative 2. Syndesmosis Squeeze Test: Negative  *Positive calf tenderness with right lower extremity swelling.  Positive Homans' sign. [de-identified] : ACC: [id number] EXAM: CT LWR EXT RT ORDERED BY: ROBBIN JOSEPH  PROCEDURE DATE: 12/22/2023    INTERPRETATION: HISTORY: Pain in popping sensation at the ankle. Decreased Achilles tendon strength. Gastrocnemius edema.  Helical CT imaging of the right lower extremity from the level of the knee to the level of the ankle was performed without intravenous contrast. Sagittal and coronal reformats were provided.  FINDINGS:  In the region of the proximal Achilles tendon and the distal Achilles myotendinous junction there is circumferential fluid and edema with irregular contour of the Achilles tendon. Findings are consistent with a high-grade tear of the Achilles tendon. This could be better characterized with MRI. In association with this finding the more distal portion of the Achilles tendon appears rounded in morphology suggestive of tendinosis. Myofascial edema extends proximally along the soleal musculature.  There is no evidence of acute fracture or dislocation. There is moderate tricompartmental arthrosis of the knee. The hindfoot articulations appear preserved. There is enthesopathic change along the fibular head. No knee joint effusion.  IMPRESSION:  Findings consistent with a high-grade tear of the proximal Achilles tendon and distal myotendinous junction. Findings could be better characterize with MRI. Myofascial edema extends along the soleus muscle.  No evidence of acute fracture.  --- End of Report ---      TU COOLEY MD; Attending Radiologist This document has been electronically signed. Dec 22 2023 12:36PM   ACC: [id number] EXAM: XR ANKLE COMP MIN 3 VIEWS RT ORDERED BY: ROBBIN JOSEPH  PROCEDURE DATE: 12/22/2023    INTERPRETATION: Right ankle. 3 views. Patient has local pain.  The ankle is rather free of degeneration. No fracture.  IMPRESSION: Negative study.  --- End of Report ---      BRYN MOSLEY MD; Attending Radiologist This document has been electronically signed. Dec 22 2023 11:49AM

## 2024-01-03 NOTE — HISTORY OF PRESENT ILLNESS
[FreeTextEntry1] : 01/03/2024: AJAY TO is a 63 year old male presenting to the office for a follow up evaluation of his right Achilles tendon. He reports that he was scheduled for an MRI yesterday, but it was canceled, so he will have the MRI done today. He notes that his symptoms have remained relatively unchanged since his last visit. He notes that he feels ok in the CAM boot. He presents to the office NWB in a CAM boot and ambulating without assistance.  12/28/2023: The patient is a 63-year-old male who presents with right Achilles tendon pain.  The patient went to [facility] on 12/23/2023 where an x-ray of the right ankle and a CT scan of the right ankle were performed.  CT scan questionable for a proximal Achilles tendon tear.  The patient states that he injured his right Achilles tendon last Thursday while in [place] while playing football on the beach with his kids.  He felt a pop in the back portion of his upper ankle/calf.  He did not seek medical attention in [place] and decided to fly home.  The patient states that he has taken 325 mg of aspirin daily.  At the hospital a venous Doppler ultrasound was not performed.  He does have a swollen left leg with pain to the left upper ankle, Achilles tendon region.  He was placed in a splint at [facility] which she arrived in today using crutches.  He has no other complaints.  His pain is controlled.

## 2024-01-03 NOTE — DISCUSSION/SUMMARY
[de-identified] : Today I had a lengthy discussion with the patient regarding their right Achilles tendon rupture. I have addressed all the patient's concerns surrounding the pathology of their condition. We had a lengthy surgical discussion.   He will continue with the aspirin daily for DVT Prophylaxis. I recommend that the patient utilize ice, NSAIDS PRN, and heat. They can also elevate their RLE above the level of the heart. He will follow up with the MRI results via TTM once the MRI is done.   A lengthy discussion was had about the surgery. All risks, benefits and alternatives to the recommended surgical procedure were discussed which include but are not limited to bleeding, infection, nerve damage, vascular damage, failure of the wound to heal, the need for further surgery, loss of limb, DVT, PE, loss of life as well as the risks associated with general anesthesia. After the results of the MRI the patient will reconsider his surgical options. In the interim, he will remain NWB, he will also sleep with the boot.    The patient understood and verbally agreed to the treatment plan. All of their questions were answered, and they were satisfied with the visit. The patient should call the office if they have any questions or experience worsening symptoms.

## 2024-01-05 ENCOUNTER — APPOINTMENT (OUTPATIENT)
Dept: ORTHOPEDIC SURGERY | Facility: CLINIC | Age: 64
End: 2024-01-05

## 2024-01-07 ENCOUNTER — APPOINTMENT (OUTPATIENT)
Dept: MRI IMAGING | Facility: CLINIC | Age: 64
End: 2024-01-07
Payer: COMMERCIAL

## 2024-01-07 ENCOUNTER — OUTPATIENT (OUTPATIENT)
Dept: OUTPATIENT SERVICES | Facility: HOSPITAL | Age: 64
LOS: 1 days | End: 2024-01-07
Payer: COMMERCIAL

## 2024-01-07 DIAGNOSIS — S86.011A STRAIN OF RIGHT ACHILLES TENDON, INITIAL ENCOUNTER: ICD-10-CM

## 2024-01-07 PROCEDURE — 73721 MRI JNT OF LWR EXTRE W/O DYE: CPT

## 2024-01-07 PROCEDURE — 73721 MRI JNT OF LWR EXTRE W/O DYE: CPT | Mod: 26,RT

## 2024-01-10 ENCOUNTER — APPOINTMENT (OUTPATIENT)
Dept: ORTHOPEDIC SURGERY | Facility: CLINIC | Age: 64
End: 2024-01-10
Payer: COMMERCIAL

## 2024-01-10 PROCEDURE — 99214 OFFICE O/P EST MOD 30 MIN: CPT

## 2024-01-10 NOTE — ADDENDUM
[FreeTextEntry1] : I, JACKIE REBECCA, acted solely as a scribe for Dr. Erasmo Finney on this date 01/10/2024  .   All medical record entries made by the Scribe were at my, Dr. Erasmo Finney, direction and personally dictated by me on 01/10/2024 . I have reviewed the chart and agree that the record accurately reflects my personal performance of the history, physical exam, assessment and plan. I have also personally directed, reviewed, and agreed with the chart.

## 2024-01-10 NOTE — PHYSICAL EXAM
[de-identified] : Right ankle Physical Examination:  General: Alert and oriented x3.  In no acute distress.  Pleasant in nature with a normal affect.  No apparent respiratory distress.  Erythema, Warmth, Rubor: Negative Swelling: Positive swelling left lower extremity/ankle and foot.  ROM: Limited due to the Achilles tendon rupture. 1. Dorsiflexion: 0 degrees 2. Plantarflexion: 40 degrees 3. Inversion: 20 degrees 4. Eversion: 20 degrees  Tenderness to Palpation:  1. Lateral Malleolus: Negative 2. Medial Malleolus: Negative 3. Proximal Fibular Pain: Negative 4. Heel Pain: Negative 5. Cuboid: Negative 6. Navicular: Negative 7. Tibiotalar Joint: Negative 8. Subtalar Joint: Negative 9. Posterior Recess: Negative  Tendon Pain: 1. Achilles: Positive with palpable defect proximal Achilles tendon with a positive Woo test with the patient prone. 2. Peroneals: Negative 3. Posterior Tibialis: Negative 4. Tibialis Anterior: Negative  Ligament Pain: 1. ATFL: Negative 2. CFL: Negative  3. PTFL: Negative 4. Deltoid Ligaments: Negative 5. Lis Franc Ligament: Negative  Stability:  1. Anterior Drawer: Negative 2. Posterior Drawer: Negative  Strength: 5/5 TA/GS/EHL  Pulses: 2+ DP/PT Pulses  Neuro: Intact motor and sensory  Additional Test: 1. Calcaneal Squeeze Test: Negative 2. Syndesmosis Squeeze Test: Negative  *Positive calf tenderness with right lower extremity swelling.  Positive Homans' sign. [de-identified] : EXAM: [id number] - MR ANKLE RT  - ORDERED BY: RIMA RUIZ   PROCEDURE DATE:  01/07/2024    INTERPRETATION:  EXAMINATION: MR ANKLE RIGHT  CLINICAL INDICATION: Pain. Evaluate for proximal achilles rupture.  COMPARISON: Radiographs dated 12/22/2023.  TECHNIQUE: Multiplanar, multi-sequence MRI of the right ankle was performed without intravenous contrast.  INTERPRETATION:  Localizer: No additional findings.  Joint space: There is a small tibiotalar/subtalar effusion; prominent fluid tracks inferiorly along the flexor hallucis longus tendon.  Bones and articular cartilage: There is no fracture or bone marrow edema. There is no focal cartilage defect.  Tendons and bursae: The Achilles tendon demonstrates full-thickness rupture of its distal fibers at 9.6 cm proximal to its insertion with a fiber gap of approximately 3 cm craniocaudally. The flexor, extensor, peroneal, and Achilles tendons are intact.  There is no abnormal bursal distension.  Ligaments: There is scar remodeling of the deep fibers of the deltoid ligament consistent with prior sprain/tear. There is diminution of the anterior talofibular ligament also likely relating to remote injury.  Plantar fascia: There is thickening of the medial cord of the plantar fascia with intermediate T2 hyperintensity consistent with planter fasciitis.  Other: There is mild bimalleolar edema.  IMPRESSION: 1.  Acute versus subacute full-thickness rupture of the distal Achilles tendon. The tear is present at 9.6 cm proximal to the insertion with a fiber gap of approximately 3 cm craniocaudally.  2.  Scar remodeling of deltoid ligament deep fibers and ATFL.  3.  Small tibiotalar effusion.  4.  Medial cord planter fasciitis.  COMMUNICATION: Findings were sent via [employer] email to Dr. Ruiz at the time of this dictation.  --- End of Report ---       SHLOMIT GOLDBERG STEIN MD; Attending Radiologist This document has been electronically signed. Jan 7 2024  6:03PM _______________________________________________________________________________________________________________________ ACC: [id number] EXAM: CT LWR EXT RT ORDERED BY: ROBBIN JOSEPH  PROCEDURE DATE: 12/22/2023    INTERPRETATION: HISTORY: Pain in popping sensation at the ankle. Decreased Achilles tendon strength. Gastrocnemius edema.  Helical CT imaging of the right lower extremity from the level of the knee to the level of the ankle was performed without intravenous contrast. Sagittal and coronal reformats were provided.  FINDINGS:  In the region of the proximal Achilles tendon and the distal Achilles myotendinous junction there is circumferential fluid and edema with irregular contour of the Achilles tendon. Findings are consistent with a high-grade tear of the Achilles tendon. This could be better characterized with MRI. In association with this finding the more distal portion of the Achilles tendon appears rounded in morphology suggestive of tendinosis. Myofascial edema extends proximally along the soleal musculature.  There is no evidence of acute fracture or dislocation. There is moderate tricompartmental arthrosis of the knee. The hindfoot articulations appear preserved. There is enthesopathic change along the fibular head. No knee joint effusion.  IMPRESSION:  Findings consistent with a high-grade tear of the proximal Achilles tendon and distal myotendinous junction. Findings could be better characterize with MRI. Myofascial edema extends along the soleus muscle.  No evidence of acute fracture.  --- End of Report ---      TU COOLEY MD; Attending Radiologist This document has been electronically signed. Dec 22 2023 12:36PM   ACC: [id number] EXAM: XR ANKLE COMP MIN 3 VIEWS RT ORDERED BY: ROBBIN JOSEPH  PROCEDURE DATE: 12/22/2023    INTERPRETATION: Right ankle. 3 views. Patient has local pain.  The ankle is rather free of degeneration. No fracture.  IMPRESSION: Negative study.  --- End of Report ---      BRYN MOSLEY MD; Attending Radiologist This document has been electronically signed. Dec 22 2023 11:49AM

## 2024-01-10 NOTE — DISCUSSION/SUMMARY
[de-identified] : Today I had a lengthy discussion with the patient regarding their right ankle, Achilles tendon. I have addressed all the patient's concerns surrounding the pathology of their condition.  I have reviewed the patient's MRI results with them in great detail. We had an in depth surgical discussion. Due to the high placement of the patient's tear, I discussed with the patient that he is a candidate for non-operative Achilles treatment.   I recommend that the patient utilize ice, NSAIDS PRN, and heat. They can also elevate their RLE above the level of the heart. A lengthy discussion was had about the surgery. All risks, benefits and alternatives to the recommended surgical procedure were discussed which include but are not limited to bleeding, infection, nerve damage, vascular damage, failure of the wound to heal, the need for further surgery, loss of limb, DVT, PE, loss of life as well as the risks associated with general anesthesia. The patient will discuss with his wife and family and call with his decision to continue with surgical or non-surgical intervention. Should he choose non-op protocol, he will begin physical therapy immediately, following non-op protocols.    The patient understood and verbally agreed to the treatment plan. All of their questions were answered, and they were satisfied with the visit. The patient should call the office if they have any questions or experience worsening symptoms.

## 2024-01-10 NOTE — HISTORY OF PRESENT ILLNESS
[FreeTextEntry1] : 01/10/2024: AJAY TO is a 63 year old male presenting to the office for a follow up evaluation and MRI review of his right ankle, Achilles tendon. He reports that his symptoms have remained unchanged since his last visit. He presents to the office NWB in a CAM boot and ambulating with crutches.   01/03/2024: AJAY TO is a 63 year old male presenting to the office for a follow up evaluation of his right Achilles tendon. He reports that he was scheduled for an MRI yesterday, but it was canceled, so he will have the MRI done today. He notes that his symptoms have remained relatively unchanged since his last visit. He notes that he feels ok in the CAM boot. He presents to the office NWB in a CAM boot and ambulating without assistance.  12/28/2023: The patient is a 63-year-old male who presents with right Achilles tendon pain.  The patient went to [facility] on 12/23/2023 where an x-ray of the right ankle and a CT scan of the right ankle were performed.  CT scan questionable for a proximal Achilles tendon tear.  The patient states that he injured his right Achilles tendon last Thursday while in [place] while playing football on the beach with his kids.  He felt a pop in the back portion of his upper ankle/calf.  He did not seek medical attention in [place] and decided to fly home.  The patient states that he has taken 325 mg of aspirin daily.  At the hospital a venous Doppler ultrasound was not performed.  He does have a swollen left leg with pain to the left upper ankle, Achilles tendon region.  He was placed in a splint at [facility] which she arrived in today using crutches.  He has no other complaints.  His pain is controlled.

## 2024-01-12 ENCOUNTER — APPOINTMENT (OUTPATIENT)
Dept: ORTHOPEDIC SURGERY | Facility: CLINIC | Age: 64
End: 2024-01-12
Payer: COMMERCIAL

## 2024-01-12 ENCOUNTER — APPOINTMENT (OUTPATIENT)
Dept: ORTHOPEDIC SURGERY | Facility: CLINIC | Age: 64
End: 2024-01-12

## 2024-01-12 PROCEDURE — 99441: CPT

## 2024-03-04 ENCOUNTER — APPOINTMENT (OUTPATIENT)
Dept: ORTHOPEDIC SURGERY | Facility: CLINIC | Age: 64
End: 2024-03-04
Payer: COMMERCIAL

## 2024-03-04 PROCEDURE — 99213 OFFICE O/P EST LOW 20 MIN: CPT

## 2024-03-04 NOTE — PHYSICAL EXAM
[de-identified] : EXAM: [id number] - MR ANKLE RT  - ORDERED BY: RIMA RUIZ   PROCEDURE DATE:  01/07/2024    INTERPRETATION:  EXAMINATION: MR ANKLE RIGHT  CLINICAL INDICATION: Pain. Evaluate for proximal achilles rupture.  COMPARISON: Radiographs dated 12/22/2023.  TECHNIQUE: Multiplanar, multi-sequence MRI of the right ankle was performed without intravenous contrast.  INTERPRETATION:  Localizer: No additional findings.  Joint space: There is a small tibiotalar/subtalar effusion; prominent fluid tracks inferiorly along the flexor hallucis longus tendon.  Bones and articular cartilage: There is no fracture or bone marrow edema. There is no focal cartilage defect.  Tendons and bursae: The Achilles tendon demonstrates full-thickness rupture of its distal fibers at 9.6 cm proximal to its insertion with a fiber gap of approximately 3 cm craniocaudally. The flexor, extensor, peroneal, and Achilles tendons are intact.  There is no abnormal bursal distension.  Ligaments: There is scar remodeling of the deep fibers of the deltoid ligament consistent with prior sprain/tear. There is diminution of the anterior talofibular ligament also likely relating to remote injury.  Plantar fascia: There is thickening of the medial cord of the plantar fascia with intermediate T2 hyperintensity consistent with planter fasciitis.  Other: There is mild bimalleolar edema.  IMPRESSION: 1.  Acute versus subacute full-thickness rupture of the distal Achilles tendon. The tear is present at 9.6 cm proximal to the insertion with a fiber gap of approximately 3 cm craniocaudally.  2.  Scar remodeling of deltoid ligament deep fibers and ATFL.  3.  Small tibiotalar effusion.  4.  Medial cord planter fasciitis.  COMMUNICATION: Findings were sent via [employer] email to Dr. Ruiz at the time of this dictation.  --- End of Report ---       SHLOMIT GOLDBERG STEIN MD; Attending Radiologist This document has been electronically signed. Jan 7 2024  6:03PM _______________________________________________________________________________________________________________________ ACC: [id number] EXAM: CT LWR EXT RT ORDERED BY: ROBBIN JOSEPH  PROCEDURE DATE: 12/22/2023    INTERPRETATION: HISTORY: Pain in popping sensation at the ankle. Decreased Achilles tendon strength. Gastrocnemius edema.  Helical CT imaging of the right lower extremity from the level of the knee to the level of the ankle was performed without intravenous contrast. Sagittal and coronal reformats were provided.  FINDINGS:  In the region of the proximal Achilles tendon and the distal Achilles myotendinous junction there is circumferential fluid and edema with irregular contour of the Achilles tendon. Findings are consistent with a high-grade tear of the Achilles tendon. This could be better characterized with MRI. In association with this finding the more distal portion of the Achilles tendon appears rounded in morphology suggestive of tendinosis. Myofascial edema extends proximally along the soleal musculature.  There is no evidence of acute fracture or dislocation. There is moderate tricompartmental arthrosis of the knee. The hindfoot articulations appear preserved. There is enthesopathic change along the fibular head. No knee joint effusion.  IMPRESSION:  Findings consistent with a high-grade tear of the proximal Achilles tendon and distal myotendinous junction. Findings could be better characterize with MRI. Myofascial edema extends along the soleus muscle.  No evidence of acute fracture.  --- End of Report ---      TU COOLEY MD; Attending Radiologist This document has been electronically signed. Dec 22 2023 12:36PM   ACC: [id number] EXAM: XR ANKLE COMP MIN 3 VIEWS RT ORDERED BY: ROBBIN JOSEPH  PROCEDURE DATE: 12/22/2023    INTERPRETATION: Right ankle. 3 views. Patient has local pain.  The ankle is rather free of degeneration. No fracture.  IMPRESSION: Negative study.  --- End of Report ---      BRYN MOSLEY MD; Attending Radiologist This document has been electronically signed. Dec 22 2023 11:49AM [de-identified] : Right ankle Physical Examination:  General: Alert and oriented x3.  In no acute distress.  Pleasant in nature with a normal affect.  No apparent respiratory distress.  Erythema, Warmth, Rubor: Negative Swelling: Negative  ROM: 1. Dorsiflexion: 10 degrees 2. Plantarflexion: 40 degrees 3. Inversion: 30 degrees 4. Eversion: 20 degrees  Tenderness to Palpation:  1. Lateral Malleolus: Negative 2. Medial Malleolus: Negative 3. Proximal Fibular Pain: Negative 4. Heel Pain: Negative 5. Cuboid: Negative 6. Navicular: Negative 7. Tibiotalar Joint: Negative 8. Subtalar Joint: Negative 9. Posterior Recess: Negative  Tendon Pain: 1. Achilles: Negative 2. Peroneals: Negative 3. Posterior Tibialis: Negative 4. Tibialis Anterior: Negative  Ligament Pain: 1. ATFL: Negative 2. CFL: Negative  3. PTFL: Negative 4. Deltoid Ligaments: Negative 5. Lis Franc Ligament: Negative  Stability:  1. Anterior Drawer: Negative 2. Posterior Drawer: Negative  Strength: 5/5 TA/GS/EHL  Pulses: 2+ DP/PT Pulses  Neuro: Intact motor and sensory  Additional Test: 1. Calcaneal Squeeze Test: Negative 2. Syndesmosis Squeeze Test: Negative 3. Woo test: slight flicker noted

## 2024-03-04 NOTE — DISCUSSION/SUMMARY
[de-identified] : Today I had a lengthy discussion with the patient regarding their Right Achilles tendon rupture. I have addressed all the patient's concerns surrounding the pathology of their condition. At this time, I would like the patient to continue with conservative management. The patient should continue with physical therapy where he should transition out of the CAM boot into an ASO brace. The patient was fitted for the ASO brace in the office today. He will continue to work on gastroc strengthening and ADL functioning under the guidance of his Physical Therapist. Once the patient transition out of the CAM boot into a supportive sneaker the patient can begin to drive. In the interim, I recommend that the patient utilize ice, NSAIDs, and heat PRN. The patient understood and verbally agreed to the treatment plan. All of their questions were answered, and they were satisfied with the visit. The patient should call the office if they have any questions or experience worsening symptoms.  I would like to see the patient back in the office in 3 months to reassess their condition.

## 2024-03-04 NOTE — HISTORY OF PRESENT ILLNESS
[FreeTextEntry1] : 3/4/2024: The patient is a 63-year-old male who presents to the office for follow-up evaluation of his right ankle, Achilles tendon. He is accompanied by his wife. The patient states that he has been compliant with physical therapy. His symptoms have been improving since his previous visit, but still experiences weakness within the ankle. He presents today wearing a CAM boot and is ambulating without assistance. No other complaints.   01/10/2024: AJAY TO is a 63 year old male presenting to the office for a follow up evaluation and MRI review of his right ankle, Achilles tendon. He reports that his symptoms have remained unchanged since his last visit. He presents to the office NWB in a CAM boot and ambulating with crutches.   01/03/2024: AJAY TO is a 63 year old male presenting to the office for a follow up evaluation of his right Achilles tendon. He reports that he was scheduled for an MRI yesterday, but it was canceled, so he will have the MRI done today. He notes that his symptoms have remained relatively unchanged since his last visit. He notes that he feels ok in the CAM boot. He presents to the office NWB in a CAM boot and ambulating without assistance.  12/28/2023: The patient is a 63-year-old male who presents with right Achilles tendon pain.  The patient went to [facility] on 12/23/2023 where an x-ray of the right ankle and a CT scan of the right ankle were performed.  CT scan questionable for a proximal Achilles tendon tear.  The patient states that he injured his right Achilles tendon last Thursday while in [place] while playing football on the beach with his kids.  He felt a pop in the back portion of his upper ankle/calf.  He did not seek medical attention in [place] and decided to fly home.  The patient states that he has taken 325 mg of aspirin daily.  At the hospital a venous Doppler ultrasound was not performed.  He does have a swollen left leg with pain to the left upper ankle, Achilles tendon region.  He was placed in a splint at [facility] which she arrived in today using crutches.  He has no other complaints.  His pain is controlled.

## 2024-04-18 ENCOUNTER — APPOINTMENT (OUTPATIENT)
Dept: DERMATOLOGY | Facility: CLINIC | Age: 64
End: 2024-04-18
Payer: COMMERCIAL

## 2024-04-18 DIAGNOSIS — D22.9 MELANOCYTIC NEVI, UNSPECIFIED: ICD-10-CM

## 2024-04-18 DIAGNOSIS — L82.1 OTHER SEBORRHEIC KERATOSIS: ICD-10-CM

## 2024-04-18 DIAGNOSIS — L82.0 INFLAMED SEBORRHEIC KERATOSIS: ICD-10-CM

## 2024-04-18 PROCEDURE — 17110 DESTRUCTION B9 LES UP TO 14: CPT

## 2024-04-18 PROCEDURE — 99213 OFFICE O/P EST LOW 20 MIN: CPT | Mod: 25

## 2024-04-18 NOTE — HISTORY OF PRESENT ILLNESS
[FreeTextEntry1] : Spot of concern [de-identified] : Patient here for spot of concern on right upper back x days. It became rough and irritated.

## 2024-04-18 NOTE — PHYSICAL EXAM
[Alert] : alert [Oriented x 3] : ~L oriented x 3 [Well Nourished] : well nourished [FreeTextEntry3] : Scattered well-demarcated stuck-on appearing brown papules and plaques on the trunk  Benign nevi  Inflamed, waxy, keratotic papule of right upper back

## 2024-04-18 NOTE — ASSESSMENT
[FreeTextEntry1] : SKs:  Benign; no treatment necessary  Nevi:  Benign; no treatment necessary  Inflamed SK: LN2 x 1

## 2024-04-26 ENCOUNTER — APPOINTMENT (OUTPATIENT)
Dept: ORTHOPEDIC SURGERY | Facility: CLINIC | Age: 64
End: 2024-04-26
Payer: COMMERCIAL

## 2024-04-26 DIAGNOSIS — S86.011A STRAIN OF RIGHT ACHILLES TENDON, INITIAL ENCOUNTER: ICD-10-CM

## 2024-04-26 PROCEDURE — 99213 OFFICE O/P EST LOW 20 MIN: CPT

## 2024-04-26 NOTE — DISCUSSION/SUMMARY
[de-identified] : Today I had a lengthy discussion with the patient regarding their right Achilles tendon rupture. I have addressed all the patient's concerns surrounding the pathology of their condition. Continue with conservative treatments. I advised the patient that he can begin walking activities, as he wishes to return golfing. I advised him that he can try swinging a golf club, but to be careful as I am concerned with the rotating and pivoting motion of golf.     Plan:  1. I recommend the patient undergo a course of physical therapy for the right ankle  2-3 times a week for a total of 8-12 weeks. A prescription was given for the physical therapy today. 2. I recommend that the patient utilize ice, NSAIDS PRN, and heat.  3. A discussion was had about shoe-wear modifications. I advised the patient to utilize a wide toed cross training sneaker that better accommodates the feet. I recommended New Balance, Bailey, or Hoka to the patient.  f/u in 3 months   The patient understood and verbally agreed to the treatment plan. All of their questions were answered, and they were satisfied with the visit. The patient should call the office if they have any questions or experience worsening symptoms.

## 2024-04-26 NOTE — ADDENDUM
[FreeTextEntry1] : I, Jerrell Williams, acted solely as a scribe for Dr. Erasmo Finney on this date 04/26/2024.   All medical record entries made by the Scribe were at my, Dr. Erasmo Finney, direction and personally dictated by me on 04/26/2024 . I have reviewed the chart and agree that the record accurately reflects my personal performance of the history, physical exam, assessment and plan. I have also personally directed, reviewed, and agreed with the chart.

## 2024-04-26 NOTE — HISTORY OF PRESENT ILLNESS
[FreeTextEntry1] : 04/26/2024: AJAY TO is a 64 year old male presenting to the office for a follow up evaluation of his right Achilles tendon rupture. He reports that he is doing well. He reports major improvement with pain, especially with wearing the brace. The patient presents to the office in flip flops and ambulating without assistance.  3/4/2024: The patient is a 63-year-old male who presents to the office for follow-up evaluation of his right ankle, Achilles tendon. He is accompanied by his wife. The patient states that he has been compliant with physical therapy. His symptoms have been improving since his previous visit, but still experiences weakness within the ankle. He presents today wearing a CAM boot and is ambulating without assistance. No other complaints.   01/10/2024: AJAY TO is a 63 year old male presenting to the office for a follow up evaluation and MRI review of his right ankle, Achilles tendon. He reports that his symptoms have remained unchanged since his last visit. He presents to the office NWB in a CAM boot and ambulating with crutches.   01/03/2024: AJAY TO is a 63 year old male presenting to the office for a follow up evaluation of his right Achilles tendon. He reports that he was scheduled for an MRI yesterday, but it was canceled, so he will have the MRI done today. He notes that his symptoms have remained relatively unchanged since his last visit. He notes that he feels ok in the CAM boot. He presents to the office NWB in a CAM boot and ambulating without assistance.  12/28/2023: The patient is a 63-year-old male who presents with right Achilles tendon pain.  The patient went to [facility] on 12/23/2023 where an x-ray of the right ankle and a CT scan of the right ankle were performed.  CT scan questionable for a proximal Achilles tendon tear.  The patient states that he injured his right Achilles tendon last Thursday while in [place] while playing football on the beach with his kids.  He felt a pop in the back portion of his upper ankle/calf.  He did not seek medical attention in [place] and decided to fly home.  The patient states that he has taken 325 mg of aspirin daily.  At the hospital a venous Doppler ultrasound was not performed.  He does have a swollen left leg with pain to the left upper ankle, Achilles tendon region.  He was placed in a splint at [facility] which she arrived in today using crutches.  He has no other complaints.  His pain is controlled.

## 2024-04-26 NOTE — PHYSICAL EXAM
[de-identified] : Right ankle Physical Examination:  General: Alert and oriented x3.  In no acute distress.  Pleasant in nature with a normal affect.  No apparent respiratory distress.  Erythema, Warmth, Rubor: Negative Swelling: Negative  ROM: 1. Dorsiflexion: 10 degrees 2. Plantarflexion: 40 degrees 3. Inversion: 30 degrees 4. Eversion: 20 degrees  Tenderness to Palpation:  1. Lateral Malleolus: Negative 2. Medial Malleolus: Negative 3. Proximal Fibular Pain: Negative 4. Heel Pain: Negative 5. Cuboid: Negative 6. Navicular: Negative 7. Tibiotalar Joint: Negative 8. Subtalar Joint: Negative 9. Posterior Recess: Negative  Tendon Pain: 1. Achilles: Negative 2. Peroneals: Negative 3. Posterior Tibialis: Negative 4. Tibialis Anterior: Negative  Ligament Pain: 1. ATFL: Negative 2. CFL: Negative  3. PTFL: Negative 4. Deltoid Ligaments: Negative 5. Lis Franc Ligament: Negative  Stability:  1. Anterior Drawer: Negative 2. Posterior Drawer: Negative  Strength: 5/5 TA/GS/EHL  Pulses: 2+ DP/PT Pulses  Neuro: Intact motor and sensory  Additional Test: 1. Calcaneal Squeeze Test: Negative 2. Syndesmosis Squeeze Test: Negative 3. Woo test: slight flicker noted, improved [de-identified] : EXAM: [id number] - MR ANKLE RT  - ORDERED BY: RIMA RUIZ   PROCEDURE DATE:  01/07/2024    INTERPRETATION:  EXAMINATION: MR ANKLE RIGHT  CLINICAL INDICATION: Pain. Evaluate for proximal achilles rupture.  COMPARISON: Radiographs dated 12/22/2023.  TECHNIQUE: Multiplanar, multi-sequence MRI of the right ankle was performed without intravenous contrast.  INTERPRETATION:  Localizer: No additional findings.  Joint space: There is a small tibiotalar/subtalar effusion; prominent fluid tracks inferiorly along the flexor hallucis longus tendon.  Bones and articular cartilage: There is no fracture or bone marrow edema. There is no focal cartilage defect.  Tendons and bursae: The Achilles tendon demonstrates full-thickness rupture of its distal fibers at 9.6 cm proximal to its insertion with a fiber gap of approximately 3 cm craniocaudally. The flexor, extensor, peroneal, and Achilles tendons are intact.  There is no abnormal bursal distension.  Ligaments: There is scar remodeling of the deep fibers of the deltoid ligament consistent with prior sprain/tear. There is diminution of the anterior talofibular ligament also likely relating to remote injury.  Plantar fascia: There is thickening of the medial cord of the plantar fascia with intermediate T2 hyperintensity consistent with planter fasciitis.  Other: There is mild bimalleolar edema.  IMPRESSION: 1.  Acute versus subacute full-thickness rupture of the distal Achilles tendon. The tear is present at 9.6 cm proximal to the insertion with a fiber gap of approximately 3 cm craniocaudally.  2.  Scar remodeling of deltoid ligament deep fibers and ATFL.  3.  Small tibiotalar effusion.  4.  Medial cord planter fasciitis.  COMMUNICATION: Findings were sent via [employer] email to Dr. Ruiz at the time of this dictation.  --- End of Report ---       SHLOMIT GOLDBERG STEIN MD; Attending Radiologist This document has been electronically signed. Jan 7 2024  6:03PM _______________________________________________________________________________________________________________________ ACC: [id number] EXAM: CT LWR EXT RT ORDERED BY: ROBBIN JOSEPH  PROCEDURE DATE: 12/22/2023    INTERPRETATION: HISTORY: Pain in popping sensation at the ankle. Decreased Achilles tendon strength. Gastrocnemius edema.  Helical CT imaging of the right lower extremity from the level of the knee to the level of the ankle was performed without intravenous contrast. Sagittal and coronal reformats were provided.  FINDINGS:  In the region of the proximal Achilles tendon and the distal Achilles myotendinous junction there is circumferential fluid and edema with irregular contour of the Achilles tendon. Findings are consistent with a high-grade tear of the Achilles tendon. This could be better characterized with MRI. In association with this finding the more distal portion of the Achilles tendon appears rounded in morphology suggestive of tendinosis. Myofascial edema extends proximally along the soleal musculature.  There is no evidence of acute fracture or dislocation. There is moderate tricompartmental arthrosis of the knee. The hindfoot articulations appear preserved. There is enthesopathic change along the fibular head. No knee joint effusion.  IMPRESSION:  Findings consistent with a high-grade tear of the proximal Achilles tendon and distal myotendinous junction. Findings could be better characterize with MRI. Myofascial edema extends along the soleus muscle.  No evidence of acute fracture.  --- End of Report ---      TU COOLEY MD; Attending Radiologist This document has been electronically signed. Dec 22 2023 12:36PM   ACC: [id number] EXAM: XR ANKLE COMP MIN 3 VIEWS RT ORDERED BY: ROBBIN JOSEPH  PROCEDURE DATE: 12/22/2023    INTERPRETATION: Right ankle. 3 views. Patient has local pain.  The ankle is rather free of degeneration. No fracture.  IMPRESSION: Negative study.  --- End of Report ---      BRYN MOSLEY MD; Attending Radiologist This document has been electronically signed. Dec 22 2023 11:49AM

## 2024-07-19 ENCOUNTER — APPOINTMENT (OUTPATIENT)
Dept: ORTHOPEDIC SURGERY | Facility: CLINIC | Age: 64
End: 2024-07-19
Payer: COMMERCIAL

## 2024-07-19 DIAGNOSIS — R20.0 ANESTHESIA OF SKIN: ICD-10-CM

## 2024-07-19 DIAGNOSIS — S86.011A STRAIN OF RIGHT ACHILLES TENDON, INITIAL ENCOUNTER: ICD-10-CM

## 2024-07-19 PROCEDURE — 99213 OFFICE O/P EST LOW 20 MIN: CPT

## 2024-08-16 ENCOUNTER — APPOINTMENT (OUTPATIENT)
Dept: ORTHOPEDIC SURGERY | Facility: CLINIC | Age: 64
End: 2024-08-16

## 2024-08-27 ENCOUNTER — APPOINTMENT (OUTPATIENT)
Dept: VASCULAR SURGERY | Facility: CLINIC | Age: 64
End: 2024-08-27
Payer: COMMERCIAL

## 2024-08-27 VITALS
HEART RATE: 88 BPM | OXYGEN SATURATION: 95 % | HEIGHT: 68 IN | DIASTOLIC BLOOD PRESSURE: 77 MMHG | BODY MASS INDEX: 30.62 KG/M2 | WEIGHT: 202 LBS | SYSTOLIC BLOOD PRESSURE: 113 MMHG

## 2024-08-27 DIAGNOSIS — M79.671 PAIN IN RIGHT FOOT: ICD-10-CM

## 2024-08-27 PROCEDURE — 93922 UPR/L XTREMITY ART 2 LEVELS: CPT

## 2024-08-27 PROCEDURE — 99204 OFFICE O/P NEW MOD 45 MIN: CPT

## 2024-08-27 NOTE — HISTORY OF PRESENT ILLNESS
[FreeTextEntry1] : 64 year old male with PMH of HLD presents for vascular consultation for right heel pain that has been present since December.  Reports a right Achilles tear while playing football on the beach in December which prompted orthopedic evaluation upon return from [place].  He proceeded with conservative medical management of the tear which he now states has completely healed.  He complains of right heel numbness that has been present since the initial injury and has been progressively improving.  He also reports a history of intermittent paresthesia over the dorsal aspect of the right foot that is independent of activity/position.  Patient denies any significant buttock, thigh or calf claudication symptoms. Denies digital pallor, cyanosis or tissue loss. Denies a known history of cardiac arrythmia or thrombophilia/previous thrombotic events.  Denies any significant family history of arterial insufficiency.

## 2024-08-27 NOTE — DATA REVIEWED
[FreeTextEntry1] : ABIs / Digit pressures performed today:  Triphasic flow throughout bilaterally to the level of the digits Right 1.35 Left 1.28

## 2024-08-27 NOTE — PHYSICAL EXAM
[2+] : left 2+ [Ankle Swelling (On Exam)] : not present [Varicose Veins Of Lower Extremities] : not present [] : not present [No Rash or Lesion] : No rash or lesion [Alert] : alert [Oriented to Person] : oriented to person [Oriented to Place] : oriented to place [Oriented to Time] : oriented to time [Calm] : calm [FreeTextEntry1] : Bilateral lower extremities warm, well perfused brisk cap refill < 3 seconds throughout  motor/sensory intact  no signs/symptoms of atheroembolism/thromboembolism no tissue loss/ulcerations  no edema  motor/sensory intact  no obvious varicosities no calf tenderness  [de-identified] : subtle edema / inflammation overlying right achilles in comparison to the left

## 2024-08-27 NOTE — ASSESSMENT
[FreeTextEntry1] : 64 year old male with h/o right achilles injury, paresthesias which are likely s/t injury and are slowly improving.  No evidence of significant arterial insuffciency or underlying vascular pathology.   -will RTC PRN

## 2025-02-24 ENCOUNTER — NON-APPOINTMENT (OUTPATIENT)
Age: 65
End: 2025-02-24

## 2025-02-24 VITALS — HEIGHT: 68 IN | BODY MASS INDEX: 31.37 KG/M2 | WEIGHT: 207 LBS

## 2025-02-25 ENCOUNTER — APPOINTMENT (OUTPATIENT)
Dept: CT IMAGING | Facility: CLINIC | Age: 65
End: 2025-02-25

## 2025-02-28 ENCOUNTER — APPOINTMENT (OUTPATIENT)
Dept: PULMONOLOGY | Facility: CLINIC | Age: 65
End: 2025-02-28
Payer: COMMERCIAL

## 2025-02-28 VITALS
HEART RATE: 66 BPM | TEMPERATURE: 97.9 F | BODY MASS INDEX: 31.83 KG/M2 | DIASTOLIC BLOOD PRESSURE: 77 MMHG | RESPIRATION RATE: 15 BRPM | WEIGHT: 210 LBS | HEIGHT: 68 IN | OXYGEN SATURATION: 92 % | SYSTOLIC BLOOD PRESSURE: 112 MMHG

## 2025-02-28 DIAGNOSIS — R91.8 OTHER NONSPECIFIC ABNORMAL FINDING OF LUNG FIELD: ICD-10-CM

## 2025-02-28 PROCEDURE — 99213 OFFICE O/P EST LOW 20 MIN: CPT

## 2025-06-10 ENCOUNTER — APPOINTMENT (OUTPATIENT)
Dept: CT IMAGING | Facility: CLINIC | Age: 65
End: 2025-06-10
Payer: COMMERCIAL

## 2025-06-10 ENCOUNTER — OUTPATIENT (OUTPATIENT)
Dept: OUTPATIENT SERVICES | Facility: HOSPITAL | Age: 65
LOS: 1 days | End: 2025-06-10
Payer: COMMERCIAL

## 2025-06-10 DIAGNOSIS — R91.8 OTHER NONSPECIFIC ABNORMAL FINDING OF LUNG FIELD: ICD-10-CM

## 2025-06-10 PROCEDURE — 71250 CT THORAX DX C-: CPT | Mod: 26

## 2025-06-10 PROCEDURE — 71250 CT THORAX DX C-: CPT

## 2025-06-17 ENCOUNTER — NON-APPOINTMENT (OUTPATIENT)
Age: 65
End: 2025-06-17